# Patient Record
Sex: FEMALE | Race: WHITE | HISPANIC OR LATINO
[De-identification: names, ages, dates, MRNs, and addresses within clinical notes are randomized per-mention and may not be internally consistent; named-entity substitution may affect disease eponyms.]

---

## 2022-07-14 PROBLEM — Z00.00 ENCOUNTER FOR PREVENTIVE HEALTH EXAMINATION: Status: ACTIVE | Noted: 2022-07-14

## 2022-07-18 ENCOUNTER — LABORATORY RESULT (OUTPATIENT)
Age: 37
End: 2022-07-18

## 2022-07-18 ENCOUNTER — NON-APPOINTMENT (OUTPATIENT)
Age: 37
End: 2022-07-18

## 2022-07-18 ENCOUNTER — APPOINTMENT (OUTPATIENT)
Dept: OBGYN | Facility: CLINIC | Age: 37
End: 2022-07-18

## 2022-07-18 VITALS
HEIGHT: 64 IN | DIASTOLIC BLOOD PRESSURE: 70 MMHG | BODY MASS INDEX: 19.29 KG/M2 | SYSTOLIC BLOOD PRESSURE: 108 MMHG | WEIGHT: 113 LBS

## 2022-07-18 DIAGNOSIS — Z78.9 OTHER SPECIFIED HEALTH STATUS: ICD-10-CM

## 2022-07-18 DIAGNOSIS — F12.90 CANNABIS USE, UNSPECIFIED, UNCOMPLICATED: ICD-10-CM

## 2022-07-18 DIAGNOSIS — Z01.419 ENCOUNTER FOR GYNECOLOGICAL EXAMINATION (GENERAL) (ROUTINE) W/OUT ABNORMAL FINDINGS: ICD-10-CM

## 2022-07-18 DIAGNOSIS — Z87.09 PERSONAL HISTORY OF OTHER DISEASES OF THE RESPIRATORY SYSTEM: ICD-10-CM

## 2022-07-18 PROCEDURE — 99203 OFFICE O/P NEW LOW 30 MIN: CPT | Mod: 25

## 2022-07-18 PROCEDURE — 99385 PREV VISIT NEW AGE 18-39: CPT

## 2022-07-18 PROCEDURE — 36415 COLL VENOUS BLD VENIPUNCTURE: CPT

## 2022-07-18 PROCEDURE — 81025 URINE PREGNANCY TEST: CPT

## 2022-07-18 RX ORDER — L-DESOXYEPHEDRINE 50 MG
50 INHALER (EA) NASAL
Refills: 0 | Status: ACTIVE | COMMUNITY

## 2022-07-18 RX ORDER — TIOTROPIUM BROMIDE INHALATION SPRAY 1.56 UG/1
1.25 SPRAY, METERED RESPIRATORY (INHALATION)
Qty: 4 | Refills: 0 | Status: ACTIVE | COMMUNITY
Start: 2022-06-15

## 2022-07-18 RX ORDER — ALBUTEROL SULFATE 90 UG/1
108 (90 BASE) INHALANT RESPIRATORY (INHALATION)
Qty: 6 | Refills: 0 | Status: ACTIVE | COMMUNITY
Start: 2022-06-15

## 2022-07-18 RX ORDER — LEVOTHYROXINE SODIUM 0.05 MG/1
50 TABLET ORAL
Qty: 90 | Refills: 0 | Status: ACTIVE | COMMUNITY
Start: 2022-06-15

## 2022-07-18 NOTE — DISCUSSION/SUMMARY
[FreeTextEntry1] : Cherelle Corea is a 37 year G0 here for WWE, infertility, fibroids, right tubal occlusion, normal exam\par - for tvus and amh, fertility w/u - if amh low - i would have her see Leo instead of trying to fix tube\par - f/u after tvus to disucss labs and sono\par - pap/hpv, GC/CH sent\par - SBE reviewed\par - start pnv\par \par

## 2022-07-18 NOTE — PHYSICAL EXAM
[Appropriately responsive] : appropriately responsive [Alert] : alert [No Acute Distress] : no acute distress [No Lymphadenopathy] : no lymphadenopathy [Regular Rate Rhythm] : regular rate rhythm [Soft] : soft [Non-tender] : non-tender [Non-distended] : non-distended [No HSM] : No HSM [No Lesions] : no lesions [No Mass] : no mass [Oriented x3] : oriented x3 [FreeTextEntry6] : right nipple ring [Examination Of The Breasts] : a normal appearance [No Masses] : no breast masses were palpable [Labia Majora] : normal [Labia Minora] : normal [Normal] : normal [Uterine Adnexae] : normal

## 2022-07-18 NOTE — HISTORY OF PRESENT ILLNESS
[FreeTextEntry1] : Cherelle Corea is a 37 year F G0 here for annual but also to discuss fibroids and right tubal occlusion. Patient moved to  less than one year ago. in [place], she was told she had a fibroid and her right tube was occluded on HSG. She was worked up for dysmenorrhea. Patient has been with her partner for 4 years, they have not been using any form of BC for 1-1.5 years and never conceived. She is concerned. She reports occasional dyspareunia, regular cycles, painful menses. Her previous obgyn mentioned that her tube needs to be fixed.\par Denies vaginal bleeding, discharge.\par \par BC: none\par Gynhx: Menarche at 13 yo. Reg menses, No h/o STIs/cysts/abn paps; h/o fibroids and right tubal occlusion\par Obhx:nullop\par \par Last mammo:not yet\par Last Colonoscopy:n/a\par Last Pap smear:1-2 years ago\par Last dexa:not yet\par

## 2022-07-25 ENCOUNTER — ASOB RESULT (OUTPATIENT)
Age: 37
End: 2022-07-25

## 2022-07-25 ENCOUNTER — APPOINTMENT (OUTPATIENT)
Dept: ANTEPARTUM | Facility: CLINIC | Age: 37
End: 2022-07-25

## 2022-07-25 ENCOUNTER — APPOINTMENT (OUTPATIENT)
Dept: OBGYN | Facility: CLINIC | Age: 37
End: 2022-07-25

## 2022-07-25 DIAGNOSIS — D21.9 BENIGN NEOPLASM OF CONNECTIVE AND OTHER SOFT TISSUE, UNSPECIFIED: ICD-10-CM

## 2022-07-25 DIAGNOSIS — N97.1 FEMALE INFERTILITY OF TUBAL ORIGIN: ICD-10-CM

## 2022-07-25 PROCEDURE — 76830 TRANSVAGINAL US NON-OB: CPT

## 2022-07-25 PROCEDURE — 76856 US EXAM PELVIC COMPLETE: CPT | Mod: 59

## 2022-07-25 PROCEDURE — 99441: CPT

## 2022-07-25 NOTE — HISTORY OF PRESENT ILLNESS
[FreeTextEntry1] : this was a telephonic visit. juliana consented to it. She was at home and I was at my [place] office. total time spent on phone was 5.5 mins\par \par Cherelle Corea is a 37 year F G0 here for folllow up to discuss fibroids and right tubal occlusion. She had tvus today and blood work done. TVUS normal - small 1.1 cm fibroid, adnexa nl\par lab work nl, amh 2.3, prolactin 29.4 but she was sexually active the night before.\par Will get patient's records to see what tube was occluded, she will also see with her insurance - what is covered in terms of fertility - it might make more sense to pursue IVF if completely covered\par \par Patient moved to  less than one year ago. in [place], she was told she had a fibroid and her right tube was occluded on HSG. She was worked up for dysmenorrhea. Patient has been with her partner for 4 years, they have not been using any form of BC for 1-1.5 years and never conceived. She is concerned. She reports occasional dyspareunia, regular cycles, painful menses. Her previous obgyn mentioned that her tube needs to be fixed. Denies vaginal bleeding, discharge.\par \par BC: none\par Gynhx: Menarche at 13 yo. Reg menses, No h/o STIs/cysts/abn paps; h/o fibroids and right tubal occlusion\par Obhx:nullip\par \par Last mammo:not yet\par Last Colonoscopy:n/a\par Last Pap smear:1-2 years ago\par Last dexa:not yet\par

## 2022-08-30 ENCOUNTER — NON-APPOINTMENT (OUTPATIENT)
Age: 37
End: 2022-08-30

## 2022-09-08 ENCOUNTER — APPOINTMENT (OUTPATIENT)
Dept: OBGYN | Facility: CLINIC | Age: 37
End: 2022-09-08

## 2022-09-08 VITALS
WEIGHT: 117 LBS | BODY MASS INDEX: 19.97 KG/M2 | SYSTOLIC BLOOD PRESSURE: 120 MMHG | DIASTOLIC BLOOD PRESSURE: 78 MMHG | HEIGHT: 64 IN

## 2022-09-08 DIAGNOSIS — N97.9 FEMALE INFERTILITY, UNSPECIFIED: ICD-10-CM

## 2022-09-08 PROCEDURE — 99213 OFFICE O/P EST LOW 20 MIN: CPT

## 2022-09-08 NOTE — HISTORY OF PRESENT ILLNESS
[FreeTextEntry1] : Patient is here to follow up infertility. We received her records from her previous obgyn.\par Lab work all normal from 4/2021 except TSH high - which was normal once synthroid started \par HSG shows right tubal occlusion. normal left fallopian tube\par \par AMH at our office 2.31 on 8/1/2022\par prolactin 29.4 (under <30)\par TSh nl\par gc/ch neg\par pap nilm, HR HPV pos - will repeat in one year\par tvus on 7/25/2022 - normal pelvic sono\par \par \par I explained to patient that it would not make sense to do surgery for right tube before getting sperm analysis as that might be an issue as well necessitating IVF possibly/\par i recommended her to see DEANNA - info given\par f/u in 1 year for pap/hpv

## 2022-09-09 ENCOUNTER — NON-APPOINTMENT (OUTPATIENT)
Age: 37
End: 2022-09-09

## 2022-09-30 ENCOUNTER — APPOINTMENT (OUTPATIENT)
Dept: HUMAN REPRODUCTION | Facility: CLINIC | Age: 37
End: 2022-09-30

## 2022-09-30 PROCEDURE — 36415 COLL VENOUS BLD VENIPUNCTURE: CPT

## 2022-09-30 PROCEDURE — 99204 OFFICE O/P NEW MOD 45 MIN: CPT | Mod: 25

## 2022-10-12 ENCOUNTER — APPOINTMENT (OUTPATIENT)
Dept: HUMAN REPRODUCTION | Facility: CLINIC | Age: 37
End: 2022-10-12

## 2022-10-12 PROCEDURE — 76830 TRANSVAGINAL US NON-OB: CPT

## 2022-10-12 PROCEDURE — 99213 OFFICE O/P EST LOW 20 MIN: CPT | Mod: 25

## 2022-10-12 PROCEDURE — 36415 COLL VENOUS BLD VENIPUNCTURE: CPT

## 2022-10-19 ENCOUNTER — APPOINTMENT (OUTPATIENT)
Dept: HUMAN REPRODUCTION | Facility: CLINIC | Age: 37
End: 2022-10-19

## 2022-10-19 PROCEDURE — 76830 TRANSVAGINAL US NON-OB: CPT

## 2022-10-19 PROCEDURE — 99213 OFFICE O/P EST LOW 20 MIN: CPT | Mod: 25

## 2022-10-27 ENCOUNTER — APPOINTMENT (OUTPATIENT)
Dept: HUMAN REPRODUCTION | Facility: CLINIC | Age: 37
End: 2022-10-27

## 2022-10-27 PROCEDURE — 99214 OFFICE O/P EST MOD 30 MIN: CPT | Mod: 95

## 2022-11-15 ENCOUNTER — APPOINTMENT (OUTPATIENT)
Dept: HUMAN REPRODUCTION | Facility: CLINIC | Age: 37
End: 2022-11-15

## 2022-11-15 PROCEDURE — S4042: CPT | Mod: 52

## 2022-11-15 PROCEDURE — 36415 COLL VENOUS BLD VENIPUNCTURE: CPT

## 2022-11-15 PROCEDURE — 76830 TRANSVAGINAL US NON-OB: CPT

## 2022-11-23 ENCOUNTER — APPOINTMENT (OUTPATIENT)
Dept: HUMAN REPRODUCTION | Facility: CLINIC | Age: 37
End: 2022-11-23

## 2022-11-23 PROCEDURE — 76831 ECHO EXAM UTERUS: CPT

## 2022-11-23 PROCEDURE — 58340 CATHETER FOR HYSTEROGRAPHY: CPT

## 2022-11-30 ENCOUNTER — APPOINTMENT (OUTPATIENT)
Dept: HUMAN REPRODUCTION | Facility: CLINIC | Age: 37
End: 2022-11-30

## 2022-11-30 PROCEDURE — 76856 US EXAM PELVIC COMPLETE: CPT

## 2022-11-30 PROCEDURE — 58999I: CUSTOM

## 2022-12-16 ENCOUNTER — APPOINTMENT (OUTPATIENT)
Dept: HUMAN REPRODUCTION | Facility: CLINIC | Age: 37
End: 2022-12-16

## 2022-12-16 PROCEDURE — 76857 US EXAM PELVIC LIMITED: CPT

## 2022-12-16 PROCEDURE — 99213 OFFICE O/P EST LOW 20 MIN: CPT | Mod: 25

## 2022-12-16 PROCEDURE — 36415 COLL VENOUS BLD VENIPUNCTURE: CPT

## 2023-01-04 ENCOUNTER — APPOINTMENT (OUTPATIENT)
Dept: HUMAN REPRODUCTION | Facility: CLINIC | Age: 38
End: 2023-01-04
Payer: COMMERCIAL

## 2023-01-04 PROCEDURE — 76857 US EXAM PELVIC LIMITED: CPT

## 2023-01-04 PROCEDURE — 36415 COLL VENOUS BLD VENIPUNCTURE: CPT

## 2023-01-04 PROCEDURE — 99213 OFFICE O/P EST LOW 20 MIN: CPT | Mod: 25

## 2023-01-09 ENCOUNTER — APPOINTMENT (OUTPATIENT)
Dept: HUMAN REPRODUCTION | Facility: CLINIC | Age: 38
End: 2023-01-09
Payer: COMMERCIAL

## 2023-01-09 PROCEDURE — 99211 OFF/OP EST MAY X REQ PHY/QHP: CPT | Mod: 25

## 2023-01-09 PROCEDURE — 36415 COLL VENOUS BLD VENIPUNCTURE: CPT

## 2023-01-11 ENCOUNTER — APPOINTMENT (OUTPATIENT)
Dept: HUMAN REPRODUCTION | Facility: CLINIC | Age: 38
End: 2023-01-11
Payer: COMMERCIAL

## 2023-01-11 PROCEDURE — 36415 COLL VENOUS BLD VENIPUNCTURE: CPT

## 2023-01-11 PROCEDURE — 99213 OFFICE O/P EST LOW 20 MIN: CPT | Mod: 25

## 2023-01-11 PROCEDURE — 76857 US EXAM PELVIC LIMITED: CPT

## 2023-01-13 ENCOUNTER — APPOINTMENT (OUTPATIENT)
Dept: HUMAN REPRODUCTION | Facility: CLINIC | Age: 38
End: 2023-01-13
Payer: COMMERCIAL

## 2023-01-13 PROCEDURE — 99213 OFFICE O/P EST LOW 20 MIN: CPT | Mod: 25

## 2023-01-13 PROCEDURE — 96372 THER/PROPH/DIAG INJ SC/IM: CPT

## 2023-01-13 PROCEDURE — 36415 COLL VENOUS BLD VENIPUNCTURE: CPT

## 2023-01-13 PROCEDURE — 76857 US EXAM PELVIC LIMITED: CPT

## 2023-01-14 ENCOUNTER — APPOINTMENT (OUTPATIENT)
Dept: HUMAN REPRODUCTION | Facility: CLINIC | Age: 38
End: 2023-01-14
Payer: COMMERCIAL

## 2023-01-14 PROCEDURE — 99213 OFFICE O/P EST LOW 20 MIN: CPT | Mod: 25

## 2023-01-14 PROCEDURE — 76857 US EXAM PELVIC LIMITED: CPT

## 2023-01-14 PROCEDURE — 36415 COLL VENOUS BLD VENIPUNCTURE: CPT

## 2023-01-16 ENCOUNTER — APPOINTMENT (OUTPATIENT)
Dept: HUMAN REPRODUCTION | Facility: CLINIC | Age: 38
End: 2023-01-16
Payer: COMMERCIAL

## 2023-01-16 PROCEDURE — 99213 OFFICE O/P EST LOW 20 MIN: CPT | Mod: 25

## 2023-01-16 PROCEDURE — 36415 COLL VENOUS BLD VENIPUNCTURE: CPT

## 2023-01-16 PROCEDURE — 76857 US EXAM PELVIC LIMITED: CPT

## 2023-01-17 ENCOUNTER — APPOINTMENT (OUTPATIENT)
Dept: HUMAN REPRODUCTION | Facility: CLINIC | Age: 38
End: 2023-01-17
Payer: COMMERCIAL

## 2023-01-17 PROCEDURE — 99213 OFFICE O/P EST LOW 20 MIN: CPT | Mod: 25

## 2023-01-17 PROCEDURE — 76857 US EXAM PELVIC LIMITED: CPT

## 2023-01-17 PROCEDURE — 36415 COLL VENOUS BLD VENIPUNCTURE: CPT

## 2023-01-18 ENCOUNTER — APPOINTMENT (OUTPATIENT)
Dept: HUMAN REPRODUCTION | Facility: CLINIC | Age: 38
End: 2023-01-18
Payer: COMMERCIAL

## 2023-01-18 PROCEDURE — 36415 COLL VENOUS BLD VENIPUNCTURE: CPT

## 2023-01-18 PROCEDURE — 76857 US EXAM PELVIC LIMITED: CPT

## 2023-01-18 PROCEDURE — 99213 OFFICE O/P EST LOW 20 MIN: CPT | Mod: 25

## 2023-01-19 ENCOUNTER — APPOINTMENT (OUTPATIENT)
Dept: HUMAN REPRODUCTION | Facility: CLINIC | Age: 38
End: 2023-01-19
Payer: COMMERCIAL

## 2023-01-19 PROCEDURE — 36415 COLL VENOUS BLD VENIPUNCTURE: CPT

## 2023-01-20 ENCOUNTER — APPOINTMENT (OUTPATIENT)
Dept: HUMAN REPRODUCTION | Facility: CLINIC | Age: 38
End: 2023-01-20
Payer: COMMERCIAL

## 2023-01-20 PROCEDURE — 58970 RETRIEVAL OF OOCYTE: CPT

## 2023-01-20 PROCEDURE — 89268 INSEMINATION OF OOCYTES: CPT

## 2023-01-20 PROCEDURE — 89261 SPERM ISOLATION COMPLEX: CPT

## 2023-01-20 PROCEDURE — 76948 ECHO GUIDE OVA ASPIRATION: CPT

## 2023-01-20 PROCEDURE — 89254 OOCYTE IDENTIFICATION: CPT

## 2023-01-20 PROCEDURE — 89250 CULTR OOCYTE/EMBRYO <4 DAYS: CPT

## 2023-01-21 ENCOUNTER — APPOINTMENT (OUTPATIENT)
Dept: HUMAN REPRODUCTION | Facility: CLINIC | Age: 38
End: 2023-01-21
Payer: COMMERCIAL

## 2023-01-25 PROCEDURE — 89272 EXTENDED CULTURE OF OOCYTES: CPT

## 2023-01-26 PROCEDURE — 89258 CRYOPRESERVATION EMBRYO(S): CPT

## 2023-01-26 PROCEDURE — 89342 STORAGE/YEAR EMBRYO(S): CPT

## 2023-01-27 ENCOUNTER — APPOINTMENT (OUTPATIENT)
Dept: HUMAN REPRODUCTION | Facility: CLINIC | Age: 38
End: 2023-01-27
Payer: COMMERCIAL

## 2023-01-27 PROCEDURE — 99213 OFFICE O/P EST LOW 20 MIN: CPT | Mod: 25

## 2023-01-27 PROCEDURE — 76857 US EXAM PELVIC LIMITED: CPT

## 2023-01-31 ENCOUNTER — APPOINTMENT (OUTPATIENT)
Dept: HUMAN REPRODUCTION | Facility: CLINIC | Age: 38
End: 2023-01-31
Payer: COMMERCIAL

## 2023-01-31 PROCEDURE — 99213 OFFICE O/P EST LOW 20 MIN: CPT | Mod: 25

## 2023-01-31 PROCEDURE — 36415 COLL VENOUS BLD VENIPUNCTURE: CPT

## 2023-01-31 PROCEDURE — 76857 US EXAM PELVIC LIMITED: CPT

## 2023-02-14 ENCOUNTER — APPOINTMENT (OUTPATIENT)
Dept: HUMAN REPRODUCTION | Facility: CLINIC | Age: 38
End: 2023-02-14
Payer: COMMERCIAL

## 2023-02-14 PROCEDURE — 76857 US EXAM PELVIC LIMITED: CPT

## 2023-02-14 PROCEDURE — 99213 OFFICE O/P EST LOW 20 MIN: CPT | Mod: 25

## 2023-02-14 PROCEDURE — 36415 COLL VENOUS BLD VENIPUNCTURE: CPT

## 2023-02-21 ENCOUNTER — APPOINTMENT (OUTPATIENT)
Dept: HUMAN REPRODUCTION | Facility: CLINIC | Age: 38
End: 2023-02-21
Payer: COMMERCIAL

## 2023-02-21 PROCEDURE — 76857 US EXAM PELVIC LIMITED: CPT

## 2023-02-21 PROCEDURE — 99213 OFFICE O/P EST LOW 20 MIN: CPT | Mod: 25

## 2023-02-21 PROCEDURE — 36415 COLL VENOUS BLD VENIPUNCTURE: CPT

## 2023-02-27 ENCOUNTER — APPOINTMENT (OUTPATIENT)
Dept: HUMAN REPRODUCTION | Facility: CLINIC | Age: 38
End: 2023-02-27
Payer: COMMERCIAL

## 2023-02-27 PROCEDURE — 89255 PREPARE EMBRYO FOR TRANSFER: CPT

## 2023-02-27 PROCEDURE — 89352 THAWING CRYOPRESRVED EMBRYO: CPT

## 2023-02-27 PROCEDURE — 76998 US GUIDE INTRAOP: CPT

## 2023-02-27 PROCEDURE — 58974 EMBRYO TRANSFER INTRAUTERINE: CPT

## 2023-02-27 PROCEDURE — 89253 EMBRYO HATCHING: CPT

## 2023-02-27 PROCEDURE — 89398A: CUSTOM

## 2023-02-28 ENCOUNTER — APPOINTMENT (OUTPATIENT)
Dept: HUMAN REPRODUCTION | Facility: CLINIC | Age: 38
End: 2023-02-28
Payer: COMMERCIAL

## 2023-03-08 ENCOUNTER — APPOINTMENT (OUTPATIENT)
Dept: HUMAN REPRODUCTION | Facility: CLINIC | Age: 38
End: 2023-03-08
Payer: COMMERCIAL

## 2023-03-08 PROCEDURE — 36415 COLL VENOUS BLD VENIPUNCTURE: CPT

## 2023-03-10 ENCOUNTER — APPOINTMENT (OUTPATIENT)
Dept: HUMAN REPRODUCTION | Facility: CLINIC | Age: 38
End: 2023-03-10
Payer: COMMERCIAL

## 2023-03-10 PROCEDURE — 36415 COLL VENOUS BLD VENIPUNCTURE: CPT

## 2023-03-10 PROCEDURE — 99213 OFFICE O/P EST LOW 20 MIN: CPT | Mod: 25

## 2023-03-20 ENCOUNTER — APPOINTMENT (OUTPATIENT)
Dept: HUMAN REPRODUCTION | Facility: CLINIC | Age: 38
End: 2023-03-20
Payer: COMMERCIAL

## 2023-03-20 PROCEDURE — 99213 OFFICE O/P EST LOW 20 MIN: CPT | Mod: 25

## 2023-03-20 PROCEDURE — 76817 TRANSVAGINAL US OBSTETRIC: CPT

## 2023-03-24 ENCOUNTER — APPOINTMENT (OUTPATIENT)
Dept: HUMAN REPRODUCTION | Facility: CLINIC | Age: 38
End: 2023-03-24
Payer: COMMERCIAL

## 2023-03-24 PROCEDURE — 76817 TRANSVAGINAL US OBSTETRIC: CPT

## 2023-03-24 PROCEDURE — 99213 OFFICE O/P EST LOW 20 MIN: CPT | Mod: 25

## 2023-03-24 PROCEDURE — 36415 COLL VENOUS BLD VENIPUNCTURE: CPT

## 2023-03-27 ENCOUNTER — APPOINTMENT (OUTPATIENT)
Dept: HUMAN REPRODUCTION | Facility: CLINIC | Age: 38
End: 2023-03-27
Payer: COMMERCIAL

## 2023-03-27 PROCEDURE — 76817 TRANSVAGINAL US OBSTETRIC: CPT

## 2023-03-27 PROCEDURE — 36415 COLL VENOUS BLD VENIPUNCTURE: CPT

## 2023-03-27 PROCEDURE — 99213 OFFICE O/P EST LOW 20 MIN: CPT | Mod: 25

## 2023-03-30 ENCOUNTER — APPOINTMENT (OUTPATIENT)
Dept: OBGYN | Facility: CLINIC | Age: 38
End: 2023-03-30
Payer: COMMERCIAL

## 2023-03-30 VITALS
SYSTOLIC BLOOD PRESSURE: 104 MMHG | HEIGHT: 64 IN | WEIGHT: 119 LBS | BODY MASS INDEX: 20.32 KG/M2 | DIASTOLIC BLOOD PRESSURE: 70 MMHG

## 2023-03-30 PROCEDURE — P9615: CPT

## 2023-03-30 PROCEDURE — 99214 OFFICE O/P EST MOD 30 MIN: CPT

## 2023-03-30 PROCEDURE — 36415 COLL VENOUS BLD VENIPUNCTURE: CPT

## 2023-03-30 NOTE — DISCUSSION/SUMMARY
[FreeTextEntry1] : 37 year yr G P @  wks here for confirmation of pregnancy, ama, IVF\par - pap/hpv uTD - due 7/2023 (ws HPV pos, pap nilm)\par - gc/ch sent\par - continue pnv\par - AMA - start asa at 12 weeks, for gc referral (also reports FOB's sisters child had hydrocephaly and also another rmemeber in his family did as well) - referral faxed today\par - for viability sono and start PN care in 2 weeks\par - [facility] done at [facility] - will get records\par - discussed aneuploidy testing - will do it\par - sbe reviewed

## 2023-03-30 NOTE — HISTORY OF PRESENT ILLNESS
[FreeTextEntry1] : Cherelle Corea is a 37 year F G0 here for nfirmation of pregnancy. IVF By Dr. Ceron. Frozen embryo (not PGD done) on 2/27/2023, EDC 11/15/2023.\par \par  Denies vaginal bleeding, discharge.\par \par BC: none\par Gynhx: Menarche at 11 yo. Reg menses, No h/o STIs/cysts/abn paps; h/o fibroids and right tubal occlusion\par Obhx:nullip\par \par Last mammo:not yet\par Last Colonoscopy:n/a\par Last Pap smear:7/2022 - HR HPV pos, Pap nilm - needs repeat 7/2023\par Last dexa:not yet\par

## 2023-03-31 LAB
ABO + RH PNL BLD: NORMAL
BASOPHILS # BLD AUTO: 0.06 K/UL
BASOPHILS NFR BLD AUTO: 0.7 %
BLD GP AB SCN SERPL QL: NORMAL
C TRACH RRNA SPEC QL NAA+PROBE: NOT DETECTED
EOSINOPHIL # BLD AUTO: 0.11 K/UL
EOSINOPHIL NFR BLD AUTO: 1.2 %
HBV SURFACE AG SER QL: NONREACTIVE
HCT VFR BLD CALC: 41 %
HCV AB SER QL: NONREACTIVE
HCV S/CO RATIO: 0.18 S/CO
HGB A MFR BLD: 97.3 %
HGB A2 MFR BLD: 2.3 %
HGB BLD-MCNC: 13.2 G/DL
HGB F MFR BLD: 0.4 %
HGB FRACT BLD-IMP: NORMAL
HIV1+2 AB SPEC QL IA.RAPID: NONREACTIVE
IMM GRANULOCYTES NFR BLD AUTO: 0.2 %
LYMPHOCYTES # BLD AUTO: 2.95 K/UL
LYMPHOCYTES NFR BLD AUTO: 32.6 %
MAN DIFF?: NORMAL
MCHC RBC-ENTMCNC: 28.6 PG
MCHC RBC-ENTMCNC: 32.2 GM/DL
MCV RBC AUTO: 88.9 FL
MONOCYTES # BLD AUTO: 0.43 K/UL
MONOCYTES NFR BLD AUTO: 4.7 %
N GONORRHOEA RRNA SPEC QL NAA+PROBE: NOT DETECTED
NEUTROPHILS # BLD AUTO: 5.49 K/UL
NEUTROPHILS NFR BLD AUTO: 60.6 %
PLATELET # BLD AUTO: 300 K/UL
RBC # BLD: 4.61 M/UL
RBC # FLD: 12.5 %
SOURCE AMPLIFICATION: NORMAL
T PALLIDUM AB SER QL IA: NEGATIVE
WBC # FLD AUTO: 9.06 K/UL

## 2023-04-03 LAB
BACTERIA UR CULT: NORMAL
CMV IGG SERPL QL: 7.3 U/ML
CMV IGG SERPL-IMP: POSITIVE
CMV IGM SERPL QL: <8 AU/ML
CMV IGM SERPL QL: NEGATIVE
MEV IGG FLD QL IA: >300 AU/ML
MEV IGG+IGM SER-IMP: POSITIVE
RUBV IGG FLD-ACNC: 11.4 INDEX
RUBV IGG SER-IMP: POSITIVE
T GONDII AB SER-IMP: NEGATIVE
T GONDII AB SER-IMP: NEGATIVE
T GONDII IGG SER QL: <3 IU/ML
T GONDII IGM SER QL: <3 AU/ML
VZV AB TITR SER: POSITIVE
VZV IGG SER IF-ACNC: 2355 INDEX

## 2023-04-07 ENCOUNTER — APPOINTMENT (OUTPATIENT)
Dept: ANTEPARTUM | Facility: CLINIC | Age: 38
End: 2023-04-07

## 2023-04-10 ENCOUNTER — NON-APPOINTMENT (OUTPATIENT)
Age: 38
End: 2023-04-10

## 2023-04-11 LAB
B19V IGG SER QL IA: 0.19 INDEX
B19V IGG+IGM SER-IMP: NEGATIVE
B19V IGG+IGM SER-IMP: NORMAL
B19V IGM FLD-ACNC: 0.11 INDEX
B19V IGM SER-ACNC: NEGATIVE
M TB IFN-G BLD-IMP: NEGATIVE
QUANTIFERON TB PLUS MITOGEN MINUS NIL: >10 IU/ML
QUANTIFERON TB PLUS NIL: 0.01 IU/ML
QUANTIFERON TB PLUS TB1 MINUS NIL: 0 IU/ML
QUANTIFERON TB PLUS TB2 MINUS NIL: 0 IU/ML

## 2023-04-14 ENCOUNTER — NON-APPOINTMENT (OUTPATIENT)
Age: 38
End: 2023-04-14

## 2023-04-14 ENCOUNTER — ASOB RESULT (OUTPATIENT)
Age: 38
End: 2023-04-14

## 2023-04-14 ENCOUNTER — APPOINTMENT (OUTPATIENT)
Dept: ANTEPARTUM | Facility: CLINIC | Age: 38
End: 2023-04-14
Payer: COMMERCIAL

## 2023-04-14 PROCEDURE — 76815 OB US LIMITED FETUS(S): CPT

## 2023-04-21 ENCOUNTER — ASOB RESULT (OUTPATIENT)
Age: 38
End: 2023-04-21

## 2023-04-21 ENCOUNTER — APPOINTMENT (OUTPATIENT)
Dept: MATERNAL FETAL MEDICINE | Facility: CLINIC | Age: 38
End: 2023-04-21
Payer: COMMERCIAL

## 2023-04-21 PROCEDURE — 99202 OFFICE O/P NEW SF 15 MIN: CPT | Mod: 95

## 2023-04-24 ENCOUNTER — NON-APPOINTMENT (OUTPATIENT)
Age: 38
End: 2023-04-24

## 2023-04-25 ENCOUNTER — NON-APPOINTMENT (OUTPATIENT)
Age: 38
End: 2023-04-25

## 2023-04-25 ENCOUNTER — APPOINTMENT (OUTPATIENT)
Dept: OBGYN | Facility: CLINIC | Age: 38
End: 2023-04-25
Payer: COMMERCIAL

## 2023-04-25 VITALS
HEIGHT: 64 IN | DIASTOLIC BLOOD PRESSURE: 62 MMHG | WEIGHT: 130 LBS | SYSTOLIC BLOOD PRESSURE: 110 MMHG | BODY MASS INDEX: 22.2 KG/M2

## 2023-04-25 PROCEDURE — 0500F INITIAL PRENATAL CARE VISIT: CPT

## 2023-04-25 PROCEDURE — 36415 COLL VENOUS BLD VENIPUNCTURE: CPT

## 2023-05-03 ENCOUNTER — NON-APPOINTMENT (OUTPATIENT)
Age: 38
End: 2023-05-03

## 2023-05-03 LAB
CHROMOSOME13 INTERPRETATION: NORMAL
CHROMOSOME13 TEST RESULT: NORMAL
CHROMOSOME18 INTERPRETATION: NORMAL
CHROMOSOME18 TEST RESULT: NORMAL
CHROMOSOME21 INTERPRETATION: NORMAL
CHROMOSOME21 TEST RESULT: NORMAL
FETAL FRACTION: NORMAL
MICRODELETIONS INTERPRETATION: NORMAL
MICRODELETIONS RESULT: NORMAL
PERFORMANCE AND LIMITATIONS: NORMAL
RESULTS SUMMARY: NORMAL
SEX CHROMOSOME INTERPRETATION: NORMAL
SEX CHROMOSOME TEST RESULT: NORMAL
VERIFI WITH MICRODELETIONS: DETECTED

## 2023-05-04 ENCOUNTER — NON-APPOINTMENT (OUTPATIENT)
Age: 38
End: 2023-05-04

## 2023-05-09 ENCOUNTER — NON-APPOINTMENT (OUTPATIENT)
Age: 38
End: 2023-05-09

## 2023-05-09 ENCOUNTER — APPOINTMENT (OUTPATIENT)
Dept: MATERNAL FETAL MEDICINE | Facility: CLINIC | Age: 38
End: 2023-05-09
Payer: COMMERCIAL

## 2023-05-09 ENCOUNTER — ASOB RESULT (OUTPATIENT)
Age: 38
End: 2023-05-09

## 2023-05-09 ENCOUNTER — APPOINTMENT (OUTPATIENT)
Dept: ANTEPARTUM | Facility: CLINIC | Age: 38
End: 2023-05-09
Payer: COMMERCIAL

## 2023-05-09 ENCOUNTER — APPOINTMENT (OUTPATIENT)
Dept: OBGYN | Facility: CLINIC | Age: 38
End: 2023-05-09
Payer: COMMERCIAL

## 2023-05-09 VITALS
BODY MASS INDEX: 22.2 KG/M2 | WEIGHT: 130 LBS | DIASTOLIC BLOOD PRESSURE: 77 MMHG | SYSTOLIC BLOOD PRESSURE: 133 MMHG | HEIGHT: 64 IN

## 2023-05-09 DIAGNOSIS — O28.5 ABNORMAL CHROMOSOMAL AND GENETIC FINDING ON ANTENATAL SCREENING OF MOTHER: ICD-10-CM

## 2023-05-09 PROCEDURE — 99212 OFFICE O/P EST SF 10 MIN: CPT | Mod: 95

## 2023-05-09 PROCEDURE — 76801 OB US < 14 WKS SINGLE FETUS: CPT

## 2023-05-09 PROCEDURE — 0502F SUBSEQUENT PRENATAL CARE: CPT

## 2023-05-15 ENCOUNTER — APPOINTMENT (OUTPATIENT)
Dept: OBGYN | Facility: CLINIC | Age: 38
End: 2023-05-15
Payer: COMMERCIAL

## 2023-05-15 VITALS
HEIGHT: 64 IN | BODY MASS INDEX: 22.53 KG/M2 | SYSTOLIC BLOOD PRESSURE: 114 MMHG | WEIGHT: 132 LBS | DIASTOLIC BLOOD PRESSURE: 79 MMHG

## 2023-05-15 DIAGNOSIS — R33.9 RETENTION OF URINE, UNSPECIFIED: ICD-10-CM

## 2023-05-15 LAB
BILIRUB UR QL STRIP: NORMAL
CLARITY UR: CLEAR
COLLECTION METHOD: NORMAL
GLUCOSE UR-MCNC: NORMAL
HCG UR QL: 0.2 EU/DL
HGB UR QL STRIP.AUTO: NORMAL
KETONES UR-MCNC: NORMAL
LEUKOCYTE ESTERASE UR QL STRIP: NORMAL
NITRITE UR QL STRIP: NORMAL
PH UR STRIP: 6
PROT UR STRIP-MCNC: NORMAL
SP GR UR STRIP: 1.01

## 2023-05-15 PROCEDURE — 0502F SUBSEQUENT PRENATAL CARE: CPT

## 2023-05-16 ENCOUNTER — NON-APPOINTMENT (OUTPATIENT)
Age: 38
End: 2023-05-16

## 2023-05-16 ENCOUNTER — ASOB RESULT (OUTPATIENT)
Age: 38
End: 2023-05-16

## 2023-05-16 ENCOUNTER — APPOINTMENT (OUTPATIENT)
Dept: MATERNAL FETAL MEDICINE | Facility: CLINIC | Age: 38
End: 2023-05-16
Payer: COMMERCIAL

## 2023-05-16 ENCOUNTER — APPOINTMENT (OUTPATIENT)
Dept: ANTEPARTUM | Facility: CLINIC | Age: 38
End: 2023-05-16
Payer: COMMERCIAL

## 2023-05-16 DIAGNOSIS — Z36.0 ENCOUNTER FOR ANTENATAL SCREENING FOR CHROMOSOMAL ANOMALIES: ICD-10-CM

## 2023-05-16 PROCEDURE — ZZZZZ: CPT

## 2023-05-16 PROCEDURE — 59015 CHORION BIOPSY: CPT

## 2023-05-16 PROCEDURE — 76945 ECHO GUIDE VILLUS SAMPLING: CPT

## 2023-05-17 ENCOUNTER — NON-APPOINTMENT (OUTPATIENT)
Age: 38
End: 2023-05-17

## 2023-05-17 ENCOUNTER — TRANSCRIPTION ENCOUNTER (OUTPATIENT)
Age: 38
End: 2023-05-17

## 2023-05-18 ENCOUNTER — TRANSCRIPTION ENCOUNTER (OUTPATIENT)
Age: 38
End: 2023-05-18

## 2023-05-18 ENCOUNTER — INPATIENT (INPATIENT)
Facility: HOSPITAL | Age: 38
LOS: 1 days | Discharge: HOME | DRG: 779 | End: 2023-05-20
Attending: OBSTETRICS & GYNECOLOGY | Admitting: OBSTETRICS & GYNECOLOGY
Payer: COMMERCIAL

## 2023-05-18 ENCOUNTER — RESULT REVIEW (OUTPATIENT)
Age: 38
End: 2023-05-18

## 2023-05-18 ENCOUNTER — APPOINTMENT (OUTPATIENT)
Dept: MATERNAL FETAL MEDICINE | Facility: CLINIC | Age: 38
End: 2023-05-18
Payer: COMMERCIAL

## 2023-05-18 ENCOUNTER — APPOINTMENT (OUTPATIENT)
Dept: ANTEPARTUM | Facility: CLINIC | Age: 38
End: 2023-05-18
Payer: COMMERCIAL

## 2023-05-18 ENCOUNTER — NON-APPOINTMENT (OUTPATIENT)
Age: 38
End: 2023-05-18

## 2023-05-18 ENCOUNTER — ASOB RESULT (OUTPATIENT)
Age: 38
End: 2023-05-18

## 2023-05-18 VITALS
TEMPERATURE: 102 F | HEART RATE: 136 BPM | WEIGHT: 125 LBS | RESPIRATION RATE: 18 BRPM | DIASTOLIC BLOOD PRESSURE: 71 MMHG | OXYGEN SATURATION: 98 % | SYSTOLIC BLOOD PRESSURE: 111 MMHG

## 2023-05-18 VITALS
DIASTOLIC BLOOD PRESSURE: 76 MMHG | TEMPERATURE: 100.2 F | SYSTOLIC BLOOD PRESSURE: 124 MMHG | HEART RATE: 118 BPM | OXYGEN SATURATION: 98 % | HEIGHT: 64 IN | RESPIRATION RATE: 18 BRPM

## 2023-05-18 DIAGNOSIS — Z01.812 ENCOUNTER FOR PREPROCEDURAL LABORATORY EXAMINATION: ICD-10-CM

## 2023-05-18 DIAGNOSIS — A41.9 SEPSIS, UNSPECIFIED ORGANISM: ICD-10-CM

## 2023-05-18 DIAGNOSIS — O41.1290 CHORIOAMNIONITIS, UNSPECIFIED TRIMESTER, NOT APPLICABLE OR UNSPECIFIED: ICD-10-CM

## 2023-05-18 LAB
ALBUMIN SERPL ELPH-MCNC: 4.5 G/DL — SIGNIFICANT CHANGE UP (ref 3.3–5.2)
ALP SERPL-CCNC: 40 U/L — SIGNIFICANT CHANGE UP (ref 40–120)
ALT FLD-CCNC: 11 U/L — SIGNIFICANT CHANGE UP
ANION GAP SERPL CALC-SCNC: 18 MMOL/L — HIGH (ref 5–17)
APPEARANCE UR: CLEAR — SIGNIFICANT CHANGE UP
APTT BLD: 26.1 SEC — LOW (ref 27.5–35.5)
AST SERPL-CCNC: 17 U/L — SIGNIFICANT CHANGE UP
BACTERIA # UR AUTO: ABNORMAL
BASOPHILS # BLD AUTO: 0.05 K/UL — SIGNIFICANT CHANGE UP (ref 0–0.2)
BASOPHILS NFR BLD AUTO: 0.2 % — SIGNIFICANT CHANGE UP (ref 0–2)
BILIRUB SERPL-MCNC: 0.4 MG/DL — SIGNIFICANT CHANGE UP (ref 0.4–2)
BILIRUB UR-MCNC: NEGATIVE — SIGNIFICANT CHANGE UP
BLD GP AB SCN SERPL QL: SIGNIFICANT CHANGE UP
BUN SERPL-MCNC: 8.7 MG/DL — SIGNIFICANT CHANGE UP (ref 8–20)
CALCIUM SERPL-MCNC: 9.7 MG/DL — SIGNIFICANT CHANGE UP (ref 8.4–10.5)
CHLORIDE SERPL-SCNC: 95 MMOL/L — LOW (ref 96–108)
CO2 SERPL-SCNC: 20 MMOL/L — LOW (ref 22–29)
COLOR SPEC: YELLOW — SIGNIFICANT CHANGE UP
CREAT SERPL-MCNC: 0.75 MG/DL — SIGNIFICANT CHANGE UP (ref 0.5–1.3)
DIFF PNL FLD: ABNORMAL
EGFR: 104 ML/MIN/1.73M2 — SIGNIFICANT CHANGE UP
EOSINOPHIL # BLD AUTO: 0.06 K/UL — SIGNIFICANT CHANGE UP (ref 0–0.5)
EOSINOPHIL NFR BLD AUTO: 0.3 % — SIGNIFICANT CHANGE UP (ref 0–6)
EPI CELLS # UR: SIGNIFICANT CHANGE UP
FIBRINOGEN PPP-MCNC: 751 MG/DL — HIGH (ref 200–450)
GLUCOSE SERPL-MCNC: 85 MG/DL — SIGNIFICANT CHANGE UP (ref 70–99)
GLUCOSE UR QL: NEGATIVE MG/DL — SIGNIFICANT CHANGE UP
HCT VFR BLD CALC: 39.8 % — SIGNIFICANT CHANGE UP (ref 34.5–45)
HGB BLD-MCNC: 13.4 G/DL — SIGNIFICANT CHANGE UP (ref 11.5–15.5)
IMM GRANULOCYTES NFR BLD AUTO: 0.4 % — SIGNIFICANT CHANGE UP (ref 0–0.9)
INR BLD: 0.97 RATIO — SIGNIFICANT CHANGE UP (ref 0.88–1.16)
KETONES UR-MCNC: ABNORMAL
LACTATE BLDV-MCNC: 1.1 MMOL/L — SIGNIFICANT CHANGE UP (ref 0.5–2)
LEUKOCYTE ESTERASE UR-ACNC: ABNORMAL
LYMPHOCYTES # BLD AUTO: 1.54 K/UL — SIGNIFICANT CHANGE UP (ref 1–3.3)
LYMPHOCYTES # BLD AUTO: 7.3 % — LOW (ref 13–44)
MCHC RBC-ENTMCNC: 28.3 PG — SIGNIFICANT CHANGE UP (ref 27–34)
MCHC RBC-ENTMCNC: 33.7 GM/DL — SIGNIFICANT CHANGE UP (ref 32–36)
MCV RBC AUTO: 84.1 FL — SIGNIFICANT CHANGE UP (ref 80–100)
MONOCYTES # BLD AUTO: 1.24 K/UL — HIGH (ref 0–0.9)
MONOCYTES NFR BLD AUTO: 5.8 % — SIGNIFICANT CHANGE UP (ref 2–14)
NEUTROPHILS # BLD AUTO: 18.22 K/UL — HIGH (ref 1.8–7.4)
NEUTROPHILS NFR BLD AUTO: 86 % — HIGH (ref 43–77)
NITRITE UR-MCNC: NEGATIVE — SIGNIFICANT CHANGE UP
PH UR: 6 — SIGNIFICANT CHANGE UP (ref 5–8)
PLATELET # BLD AUTO: 333 K/UL — SIGNIFICANT CHANGE UP (ref 150–400)
POTASSIUM SERPL-MCNC: 3.4 MMOL/L — LOW (ref 3.5–5.3)
POTASSIUM SERPL-SCNC: 3.4 MMOL/L — LOW (ref 3.5–5.3)
PROT SERPL-MCNC: 7.2 G/DL — SIGNIFICANT CHANGE UP (ref 6.6–8.7)
PROT UR-MCNC: NEGATIVE — SIGNIFICANT CHANGE UP
PROTHROM AB SERPL-ACNC: 11.2 SEC — SIGNIFICANT CHANGE UP (ref 10.5–13.4)
RAPID RVP RESULT: SIGNIFICANT CHANGE UP
RBC # BLD: 4.73 M/UL — SIGNIFICANT CHANGE UP (ref 3.8–5.2)
RBC # FLD: 12.6 % — SIGNIFICANT CHANGE UP (ref 10.3–14.5)
RBC CASTS # UR COMP ASSIST: ABNORMAL /HPF (ref 0–4)
SARS-COV-2 RNA SPEC QL NAA+PROBE: SIGNIFICANT CHANGE UP
SODIUM SERPL-SCNC: 133 MMOL/L — LOW (ref 135–145)
SP GR SPEC: 1.01 — SIGNIFICANT CHANGE UP (ref 1.01–1.02)
UROBILINOGEN FLD QL: NEGATIVE MG/DL — SIGNIFICANT CHANGE UP
WBC # BLD: 21.2 K/UL — HIGH (ref 3.8–10.5)
WBC # FLD AUTO: 21.2 K/UL — HIGH (ref 3.8–10.5)
WBC UR QL: SIGNIFICANT CHANGE UP /HPF (ref 0–5)

## 2023-05-18 PROCEDURE — 99214 OFFICE O/P EST MOD 30 MIN: CPT

## 2023-05-18 PROCEDURE — 76998 US GUIDE INTRAOP: CPT | Mod: 26,GC

## 2023-05-18 PROCEDURE — 99291 CRITICAL CARE FIRST HOUR: CPT

## 2023-05-18 PROCEDURE — 88305 TISSUE EXAM BY PATHOLOGIST: CPT | Mod: 26

## 2023-05-18 PROCEDURE — 93010 ELECTROCARDIOGRAM REPORT: CPT

## 2023-05-18 PROCEDURE — 71045 X-RAY EXAM CHEST 1 VIEW: CPT | Mod: 26

## 2023-05-18 PROCEDURE — 76815 OB US LIMITED FETUS(S): CPT

## 2023-05-18 PROCEDURE — 71045 X-RAY EXAM CHEST 1 VIEW: CPT | Mod: 26,77

## 2023-05-18 PROCEDURE — 59821 TREATMENT OF MISCARRIAGE: CPT | Mod: GC

## 2023-05-18 RX ORDER — MIDAZOLAM HYDROCHLORIDE 1 MG/ML
1 INJECTION, SOLUTION INTRAMUSCULAR; INTRAVENOUS ONCE
Refills: 0 | Status: DISCONTINUED | OUTPATIENT
Start: 2023-05-18 | End: 2023-05-18

## 2023-05-18 RX ORDER — ACETAMINOPHEN 500 MG
975 TABLET ORAL EVERY 6 HOURS
Refills: 0 | Status: COMPLETED | OUTPATIENT
Start: 2023-05-18 | End: 2023-05-19

## 2023-05-18 RX ORDER — VANCOMYCIN HCL 1 G
1000 VIAL (EA) INTRAVENOUS ONCE
Refills: 0 | Status: COMPLETED | OUTPATIENT
Start: 2023-05-18 | End: 2023-05-18

## 2023-05-18 RX ORDER — IPRATROPIUM/ALBUTEROL SULFATE 18-103MCG
3 AEROSOL WITH ADAPTER (GRAM) INHALATION ONCE
Refills: 0 | Status: COMPLETED | OUTPATIENT
Start: 2023-05-18 | End: 2023-05-18

## 2023-05-18 RX ORDER — PIPERACILLIN AND TAZOBACTAM 4; .5 G/20ML; G/20ML
4.5 INJECTION, POWDER, LYOPHILIZED, FOR SOLUTION INTRAVENOUS EVERY 8 HOURS
Refills: 0 | Status: COMPLETED | OUTPATIENT
Start: 2023-05-18 | End: 2023-05-20

## 2023-05-18 RX ORDER — ALBUTEROL 90 UG/1
2.5 AEROSOL, METERED ORAL ONCE
Refills: 0 | Status: COMPLETED | OUTPATIENT
Start: 2023-05-18 | End: 2023-05-18

## 2023-05-18 RX ORDER — SODIUM CHLORIDE 9 MG/ML
1000 INJECTION, SOLUTION INTRAVENOUS
Refills: 0 | Status: DISCONTINUED | OUTPATIENT
Start: 2023-05-18 | End: 2023-05-18

## 2023-05-18 RX ORDER — OXYCODONE HYDROCHLORIDE 5 MG/1
5 TABLET ORAL EVERY 4 HOURS
Refills: 0 | Status: DISCONTINUED | OUTPATIENT
Start: 2023-05-18 | End: 2023-05-20

## 2023-05-18 RX ORDER — MORPHINE SULFATE 50 MG/1
2 CAPSULE, EXTENDED RELEASE ORAL ONCE
Refills: 0 | Status: DISCONTINUED | OUTPATIENT
Start: 2023-05-18 | End: 2023-05-18

## 2023-05-18 RX ORDER — FENTANYL CITRATE 50 UG/ML
50 INJECTION INTRAVENOUS
Refills: 0 | Status: DISCONTINUED | OUTPATIENT
Start: 2023-05-18 | End: 2023-05-18

## 2023-05-18 RX ORDER — PIPERACILLIN AND TAZOBACTAM 4; .5 G/20ML; G/20ML
3.38 INJECTION, POWDER, LYOPHILIZED, FOR SOLUTION INTRAVENOUS ONCE
Refills: 0 | Status: COMPLETED | OUTPATIENT
Start: 2023-05-18 | End: 2023-05-18

## 2023-05-18 RX ORDER — SODIUM CHLORIDE 9 MG/ML
1750 INJECTION, SOLUTION INTRAVENOUS ONCE
Refills: 0 | Status: COMPLETED | OUTPATIENT
Start: 2023-05-18 | End: 2023-05-18

## 2023-05-18 RX ORDER — FENTANYL CITRATE 50 UG/ML
25 INJECTION INTRAVENOUS
Refills: 0 | Status: DISCONTINUED | OUTPATIENT
Start: 2023-05-18 | End: 2023-05-18

## 2023-05-18 RX ORDER — ACETAMINOPHEN 500 MG
1000 TABLET ORAL ONCE
Refills: 0 | Status: COMPLETED | OUTPATIENT
Start: 2023-05-18 | End: 2023-05-18

## 2023-05-18 RX ORDER — KETOROLAC TROMETHAMINE 30 MG/ML
15 SYRINGE (ML) INJECTION EVERY 8 HOURS
Refills: 0 | Status: DISCONTINUED | OUTPATIENT
Start: 2023-05-18 | End: 2023-05-19

## 2023-05-18 RX ORDER — ONDANSETRON 8 MG/1
4 TABLET, FILM COATED ORAL ONCE
Refills: 0 | Status: COMPLETED | OUTPATIENT
Start: 2023-05-18 | End: 2023-05-18

## 2023-05-18 RX ORDER — SIMETHICONE 80 MG/1
80 TABLET, CHEWABLE ORAL EVERY 6 HOURS
Refills: 0 | Status: DISCONTINUED | OUTPATIENT
Start: 2023-05-18 | End: 2023-05-20

## 2023-05-18 RX ORDER — VANCOMYCIN HCL 1 G
1000 VIAL (EA) INTRAVENOUS EVERY 12 HOURS
Refills: 0 | Status: DISCONTINUED | OUTPATIENT
Start: 2023-05-19 | End: 2023-05-20

## 2023-05-18 RX ORDER — ONDANSETRON 8 MG/1
8 TABLET, FILM COATED ORAL EVERY 8 HOURS
Refills: 0 | Status: DISCONTINUED | OUTPATIENT
Start: 2023-05-18 | End: 2023-05-20

## 2023-05-18 RX ORDER — IPRATROPIUM/ALBUTEROL SULFATE 18-103MCG
3 AEROSOL WITH ADAPTER (GRAM) INHALATION EVERY 6 HOURS
Refills: 0 | Status: DISCONTINUED | OUTPATIENT
Start: 2023-05-18 | End: 2023-05-20

## 2023-05-18 RX ADMIN — Medication 15 MILLIGRAM(S): at 22:07

## 2023-05-18 RX ADMIN — MORPHINE SULFATE 2 MILLIGRAM(S): 50 CAPSULE, EXTENDED RELEASE ORAL at 18:00

## 2023-05-18 RX ADMIN — ALBUTEROL 2.5 MILLIGRAM(S): 90 AEROSOL, METERED ORAL at 20:23

## 2023-05-18 RX ADMIN — MORPHINE SULFATE 2 MILLIGRAM(S): 50 CAPSULE, EXTENDED RELEASE ORAL at 20:33

## 2023-05-18 RX ADMIN — Medication 3 MILLILITER(S): at 23:59

## 2023-05-18 RX ADMIN — Medication 3 MILLILITER(S): at 16:57

## 2023-05-18 RX ADMIN — PIPERACILLIN AND TAZOBACTAM 200 GRAM(S): 4; .5 INJECTION, POWDER, LYOPHILIZED, FOR SOLUTION INTRAVENOUS at 16:31

## 2023-05-18 RX ADMIN — Medication 1000 MILLIGRAM(S): at 16:52

## 2023-05-18 RX ADMIN — MIDAZOLAM HYDROCHLORIDE 1 MILLIGRAM(S): 1 INJECTION, SOLUTION INTRAMUSCULAR; INTRAVENOUS at 20:29

## 2023-05-18 RX ADMIN — Medication 1000 MILLIGRAM(S): at 22:36

## 2023-05-18 RX ADMIN — Medication 400 MILLIGRAM(S): at 22:06

## 2023-05-18 RX ADMIN — SODIUM CHLORIDE 1750 MILLILITER(S): 9 INJECTION, SOLUTION INTRAVENOUS at 16:31

## 2023-05-18 RX ADMIN — PIPERACILLIN AND TAZOBACTAM 3.38 GRAM(S): 4; .5 INJECTION, POWDER, LYOPHILIZED, FOR SOLUTION INTRAVENOUS at 17:01

## 2023-05-18 RX ADMIN — Medication 125 MILLIGRAM(S): at 20:39

## 2023-05-18 RX ADMIN — ONDANSETRON 4 MILLIGRAM(S): 8 TABLET, FILM COATED ORAL at 18:07

## 2023-05-18 RX ADMIN — Medication 1000 MILLIGRAM(S): at 18:31

## 2023-05-18 RX ADMIN — MIDAZOLAM HYDROCHLORIDE 1 MILLIGRAM(S): 1 INJECTION, SOLUTION INTRAMUSCULAR; INTRAVENOUS at 20:36

## 2023-05-18 RX ADMIN — SODIUM CHLORIDE 1750 MILLILITER(S): 9 INJECTION, SOLUTION INTRAVENOUS at 17:31

## 2023-05-18 RX ADMIN — MORPHINE SULFATE 2 MILLIGRAM(S): 50 CAPSULE, EXTENDED RELEASE ORAL at 18:31

## 2023-05-18 RX ADMIN — MORPHINE SULFATE 2 MILLIGRAM(S): 50 CAPSULE, EXTENDED RELEASE ORAL at 20:48

## 2023-05-18 RX ADMIN — Medication 400 MILLIGRAM(S): at 16:37

## 2023-05-18 RX ADMIN — PIPERACILLIN AND TAZOBACTAM 25 GRAM(S): 4; .5 INJECTION, POWDER, LYOPHILIZED, FOR SOLUTION INTRAVENOUS at 22:35

## 2023-05-18 RX ADMIN — Medication 250 MILLIGRAM(S): at 18:05

## 2023-05-18 NOTE — H&P ADULT - NSHPLABSRESULTS_GEN_ALL_CORE
13.4   21.20 )-----------( 333      ( 18 May 2023 16:35 )             39.8     05-18    133<L>  |  95<L>  |  8.7  ----------------------------<  85  3.4<L>   |  20.0<L>  |  0.75    Ca    9.7      18 May 2023 16:35    TPro  7.2  /  Alb  4.5  /  TBili  0.4  /  DBili  x   /  AST  17  /  ALT  11  /  AlkPhos  40  05-18    Blood Gas Venous - Lactate: 1.10 mmol/L (05.18.23 @ 16:35)

## 2023-05-18 NOTE — ED ADULT NURSE NOTE - NSFALLUNIVINTERV_ED_ALL_ED
Bed/Stretcher in lowest position, wheels locked, appropriate side rails in place/Call bell, personal items and telephone in reach/Instruct patient to call for assistance before getting out of bed/chair/stretcher/Non-slip footwear applied when patient is off stretcher/[place] to call system/Physically safe environment - no spills, clutter or unnecessary equipment/Purposeful proactive rounding/Room/bathroom lighting operational, light cord in reach

## 2023-05-18 NOTE — ED PROVIDER NOTE - PHYSICAL EXAMINATION
VITAL SIGNS: I have reviewed vital signs  CONSTITUTIONAL:  in no acute distress.  SKIN: Warm, dry, no rash.  HEAD: Normocephalic, atraumatic.  EYES: PERRL, conjunctiva and sclera clear.  ENT: pink & moist mucosa, no pharyngeal erythema  NECK: Supple, non tender. No cervical lymphadenopathy.  CARD: +TACHY, no murmurs. Sinus.   RESP: No wheezes, rales or rhonchi.   ABD:  soft, non-distended, non-tender.   MSK:  Good ROM in upper/lower extremities w/o pain.   NEURO: Alert, oriented. Grossly unremarkable. No focal deficits.   PSYCH: Cooperative, alert & oriented x3

## 2023-05-18 NOTE — ED PROVIDER NOTE - CLINICAL SUMMARY MEDICAL DECISION MAKING FREE TEXT BOX
ASSESSMENT:   VIRGEN CAVAZOS is a 38yo F who presents 14 weeks pregnant w/ fever, chills, low back pain x 1 days. Vital consistent w/ sepsis, no shock. Physical exam non contributory. Labs pending.    PLAN: Sepsis w/ possible septic . Screen for concurrent uti, chest infection. Fever control. Empiric ABX. OB consult given the need for possible surgery to obtain source control. ASSESSMENT:   VIRGEN CAVAZOS is a 36yo F who presents 14 weeks pregnant w/ fever, chills, low back pain x 1 days. Vital consistent w/ sepsis, no shock. Physical exam non contributory. Labs pending.    PLAN: Sepsis w/ possible septic . Screen for concurrent uti, chest infection. Iv fluids and Fever control. Empiric ABX. OB consult given the need for possible surgery to obtain source control.

## 2023-05-18 NOTE — ED ADULT NURSE NOTE - OBJECTIVE STATEMENT
14 weeks pregnant. sent in from OBGYN for no fetal heartbeat. c/o HA, abdominal pain and chills and fever since this morning  Pt endorsing lower back pain and some brown spotting .  Pt seen and eval by provider , IV established , medicated as per MD orders

## 2023-05-18 NOTE — DISCUSSION/SUMMARY
[FreeTextEntry1] : We had the pleasure of seeing your patient for a Maternal-Fetal Medicine consultation today. She is a 38 year-old  at 14 1/7 weeks of gestation with a pregnancy complicated by the below issues.\par \par She was extensively counseled regarding the following issues:\par \par On examination here, patient has heart rate 110s-120s and temp 100.2. Suspected intraamniotic infection. The patient had a chorionic villous sampling procedure two days ago and developed fever, tachycardia, uterine tenderness, cramping, back pain and headache. I explained to the patient that this complication is thought to occur in 1 in 1000 procedures. The differential also includes urinary tract infection but that is less likely given no dysuria or urinary frequency. Recent FISH was positive for trisomy 21 in fetus. Patient had D&E scheduled for next week with Dr. Dixon. With new findings, patient sent to ED at [facility] [place] with plan for urgent uterine evacuation today.\par \par \par Thank you for requesting a consultation on this patient. The total time spent in preparation for this visit, medical history taking, orders, review of records, counseling the patient, and writing this note was 45 minutes.\par \par At the end of our discussion, the patient indicated that her questions were answered and she seemed satisfied with our discussion. Please do not hesitate to contact us with any questions.\par \par Sincerely,\par \par \par Jackson Cespedes MD, ENEDINA\par Attending Physician, Maternal-Fetal Medicine\par \par

## 2023-05-18 NOTE — ED ADULT NURSE REASSESSMENT NOTE - NS ED NURSE REASSESS COMMENT FT1
Patient reports chest discomfort and asthma symptoms, states hx of asthma with daily inhaler use.  Obtained order for nebulizer. OB at bedside.

## 2023-05-18 NOTE — H&P ADULT - ASSESSMENT
36yo  at 14w1d by embryo transfer (ALBER 11/15) presenting today for suspected septic AB  - Patient is febrile and tachy in ED  - Started on Vanc and Zosyn, IVF bolus given, sepsis workup initiated  - Missed ab confirmed on US today  - Plan is for emergent add on to OR for suction D&C    Plan d/w Dr. Dixon

## 2023-05-18 NOTE — H&P ADULT - ATTENDING COMMENTS
Pt seen and examined. Tachycardic, febrile and WBC 20 consistent with septic  s/p CVS. Discussed risks of dilation and aspiration, possible dilation and evacuation including but not limited to bleeding, infection, perforation with need for laparoscopy/lapartomy and hysterectomy in case of life threatening bleeding. Pt understands and accepts these risks. She understands urgency of situation. OR alerted for emergent suction dc.  Support resources and recovery sheet given and reviewed.

## 2023-05-18 NOTE — ED PROVIDER NOTE - NS ED ROS FT
Review of Systems  CONSTITUTIONAL: +FEVER, CHILLS  SKIN: warm, dry w/ no rash or bleeding  EYES: no changes to vision  ENT: no changes in hearing, no sore throat  RESPIRATORY: no cough or SOB  CARDIAC: no chest pain & no palpitations  GI: no abd pain, nausea, vomiting, diarrhea, constipation, or blood in stool/colt blood  GENITO-URINARY: no discharge, dysuria or hematuria,   MUSCULOSKELETAL:  +LOW BACK PAIN  NEUROLOGIC: +HEADACHE  PSYCH: no anxiety, non suicidal, non homicidal, without hallucinations or depression

## 2023-05-18 NOTE — CHART NOTE - NSCHARTNOTEFT_GEN_A_CORE
Called by PACU regarding patient being tachycardic to the 150s and feeling short of breath and tachypneic at bedside. Anesthesia was at bedside; duonebs administered and esmolol administered. Patient's vitals improving and symptomatically improved when evaluated at the bedside. Bleeding stable. Temp 103. Ofirmev ordered for temperature. IVF increased to 125cc/h. Broad spectrum antibiotics ordered and to be continued until 48h afebrile. Patient to be transferred to a telebed when available.     D/w Dr. Dixon

## 2023-05-18 NOTE — H&P ADULT - NSHPPHYSICALEXAM_GEN_ALL_CORE
Vital Signs Last 24 Hrs  T(C): 39 (18 May 2023 16:00), Max: 39 (18 May 2023 16:00)  T(F): 102.2 (18 May 2023 16:00), Max: 102.2 (18 May 2023 16:00)  HR: 120 (18 May 2023 16:26) (120 - 136)  BP: 121/67 (18 May 2023 16:26) (111/71 - 121/67)  BP(mean): --  RR: 18 (18 May 2023 16:26) (18 - 18)  SpO2: 96% (18 May 2023 16:26) (96% - 98%)    Parameters below as of 18 May 2023 16:26  Patient On (Oxygen Delivery Method): room air    Physical Exam  General: Alert and oriented x3, NAD, warm to touch  Heart: RRR  Lungs: CTAB  Abd: Soft, TTP

## 2023-05-18 NOTE — H&P ADULT - HISTORY OF PRESENT ILLNESS
38yo  at 14w1d by embryo transfer (ALBER 11/15) presenting today for suspected septic AB. Patient is s/p CVS 2 days ago for abnormal genetic testing. CVS with FISH showing T21. Was planned for a D&E with Dr. Dixon next week however called the [facility] office today due to feeling sick with abdominal pain. She came to the [facility] office where she was found to have fundal tenderness, fever and tachycardia. US performed showing no FH. She was sent emergently to [facility] for suction D&C.    Medhx: Asthma, hypothyroidism  Surghx: Tonsillectomy  Meds: synthroid, albuterol prn  All: NKDA

## 2023-05-18 NOTE — ED ADULT NURSE NOTE - NS ED NURSE LEVEL OF CONSCIOUSNESS AFFECT
Pt called and stated she got most of the other medications she asked for a refill on but now she needs a refill for the losartan (COZAAR) 50 mg tablet and I let her know that I would go ahead and put a message in and she should be able to get in 24-48 hours. Please advise. Calm

## 2023-05-18 NOTE — BRIEF OPERATIVE NOTE - NSICDXBRIEFPROCEDURE_GEN_ALL_CORE_FT
PROCEDURES:  Dilation and curettage, uterus, using suction, with US guidance 18-May-2023 19:55:23  Magdalena Daniel

## 2023-05-18 NOTE — PATIENT PROFILE ADULT - FALL HARM RISK - RISK INTERVENTIONS
Assistance OOB with selected safe patient handling equipment/Assistance with ambulation/Communicate Fall Risk and Risk Factors to all staff, patient, and family/Monitor gait and stability/Reinforce activity limits and safety measures with patient and family/Sit up slowly, dangle for a short time, stand at bedside before walking/Use of alarms - bed, chair and/or voice tab/Visual Cue: Yellow wristband/Bed in lowest position, wheels locked, appropriate side rails in place/Call bell, personal items and telephone in reach/Instruct patient to call for assistance before getting out of bed or chair/Non-slip footwear when patient is out of bed/[place] to call system/Physically safe environment - no spills, clutter or unnecessary equipment/Purposeful Proactive Rounding/Room/bathroom lighting operational, light cord in reach

## 2023-05-18 NOTE — ED PROVIDER NOTE - PROGRESS NOTE DETAILS
Dima: PT c/o chest pressure. States it feels like asthma. Mild wheezing on exam, no rash, airway edema. No abdominal pain. Maintaining sats. Will provided duonebs. Dima: OB consulted for probable septic  given fever, tachycardia in setting of not fetal heart rate.

## 2023-05-18 NOTE — ED PROVIDER NOTE - CADM POA URETHRAL CATHETER
Judith from [place] needs the referral to Dermatology on [address] for her to set up the appointment.    Call her at .   No

## 2023-05-18 NOTE — H&P ADULT - NSCORESITESY/N_GEN_A_CORE_RD
How Severe Is Your Skin Lesion?: moderate Have Your Skin Lesions Been Treated?: not been treated Is This A New Presentation, Or A Follow-Up?: Skin Lesion No

## 2023-05-18 NOTE — ED PROVIDER NOTE - OBJECTIVE STATEMENT
VIRGEN CAVAZOS is a 38yo Female with no PMH  who presents c/o fevers, low back pain x 1 day. PT states she was at her ob/gyn office for a follow up appointment s/p chorionic villous sampling two days ago and was found to have no fetal heart rate. PT denies any symptoms of cp/sob, nausea, vomiting, abdominal pain, urinary symptoms, weakness, confusion.

## 2023-05-18 NOTE — ED PROVIDER NOTE - ATTENDING CONTRIBUTION TO CARE
I, Jd Macario, personally saw the patient with the resident, and completed the key components of the history and physical exam. I then discussed the management plan with the resident.    Upon my evaluation, this patient had a high probability of imminent or life-threatening deterioration due to septic  , which required my direct attention, intervention, and personal management.    36 yo F 14 weeks pregnant via IVF transfer p/w fever and abdominal pain. Patient had CVS 2 days prior and found to have T21. Patient was seen in the office today and fond no fetal HR. Patient denies vaginal discharge. Patient found febril and tachycardic. IVF, tylenol, vanc and zosyn given for sepsis. OB consulted. they will take patient for D&C.     I have personally provided  35 minutes of critical care time exclusive of time spent on separately billable procedures.  Time includes review of laboratory data, radiology results, discussion with consultants, and monitoring for potential decompensation.  Interventions were performed as documented.

## 2023-05-19 LAB
BASOPHILS # BLD AUTO: 0 K/UL — SIGNIFICANT CHANGE UP (ref 0–0.2)
BASOPHILS NFR BLD AUTO: 0 % — SIGNIFICANT CHANGE UP (ref 0–2)
EOSINOPHIL # BLD AUTO: 0 K/UL — SIGNIFICANT CHANGE UP (ref 0–0.5)
EOSINOPHIL NFR BLD AUTO: 0 % — SIGNIFICANT CHANGE UP (ref 0–6)
HCT VFR BLD CALC: 29.8 % — LOW (ref 34.5–45)
HGB BLD-MCNC: 9.8 G/DL — LOW (ref 11.5–15.5)
LYMPHOCYTES # BLD AUTO: 0.23 K/UL — LOW (ref 1–3.3)
LYMPHOCYTES # BLD AUTO: 0.9 % — LOW (ref 13–44)
MANUAL SMEAR VERIFICATION: SIGNIFICANT CHANGE UP
MCHC RBC-ENTMCNC: 29 PG — SIGNIFICANT CHANGE UP (ref 27–34)
MCHC RBC-ENTMCNC: 32.9 GM/DL — SIGNIFICANT CHANGE UP (ref 32–36)
MCV RBC AUTO: 88.2 FL — SIGNIFICANT CHANGE UP (ref 80–100)
MONOCYTES # BLD AUTO: 0 K/UL — SIGNIFICANT CHANGE UP (ref 0–0.9)
MONOCYTES NFR BLD AUTO: 0 % — LOW (ref 2–14)
NEUTROPHILS # BLD AUTO: 24.91 K/UL — HIGH (ref 1.8–7.4)
NEUTROPHILS NFR BLD AUTO: 98.2 % — HIGH (ref 43–77)
NEUTS BAND # BLD: 0.9 % — SIGNIFICANT CHANGE UP (ref 0–8)
PLAT MORPH BLD: NORMAL — SIGNIFICANT CHANGE UP
PLATELET # BLD AUTO: 239 K/UL — SIGNIFICANT CHANGE UP (ref 150–400)
POLYCHROMASIA BLD QL SMEAR: SLIGHT — SIGNIFICANT CHANGE UP
RBC # BLD: 3.38 M/UL — LOW (ref 3.8–5.2)
RBC # FLD: 13 % — SIGNIFICANT CHANGE UP (ref 10.3–14.5)
RBC BLD AUTO: ABNORMAL
WBC # BLD: 25.14 K/UL — HIGH (ref 3.8–10.5)
WBC # FLD AUTO: 25.14 K/UL — HIGH (ref 3.8–10.5)

## 2023-05-19 RX ORDER — ACETAMINOPHEN 500 MG
975 TABLET ORAL ONCE
Refills: 0 | Status: COMPLETED | OUTPATIENT
Start: 2023-05-19 | End: 2023-05-19

## 2023-05-19 RX ORDER — LEVOTHYROXINE SODIUM 125 MCG
50 TABLET ORAL DAILY
Refills: 0 | Status: DISCONTINUED | OUTPATIENT
Start: 2023-05-19 | End: 2023-05-20

## 2023-05-19 RX ADMIN — Medication 15 MILLIGRAM(S): at 05:42

## 2023-05-19 RX ADMIN — Medication 975 MILLIGRAM(S): at 06:18

## 2023-05-19 RX ADMIN — Medication 3 MILLILITER(S): at 08:34

## 2023-05-19 RX ADMIN — Medication 50 MICROGRAM(S): at 05:42

## 2023-05-19 RX ADMIN — Medication 3 MILLILITER(S): at 21:13

## 2023-05-19 RX ADMIN — Medication 975 MILLIGRAM(S): at 06:00

## 2023-05-19 RX ADMIN — Medication 250 MILLIGRAM(S): at 05:43

## 2023-05-19 RX ADMIN — Medication 975 MILLIGRAM(S): at 00:57

## 2023-05-19 RX ADMIN — Medication 975 MILLIGRAM(S): at 17:30

## 2023-05-19 RX ADMIN — Medication 975 MILLIGRAM(S): at 05:52

## 2023-05-19 RX ADMIN — Medication 975 MILLIGRAM(S): at 05:42

## 2023-05-19 RX ADMIN — PIPERACILLIN AND TAZOBACTAM 25 GRAM(S): 4; .5 INJECTION, POWDER, LYOPHILIZED, FOR SOLUTION INTRAVENOUS at 07:37

## 2023-05-19 RX ADMIN — Medication 975 MILLIGRAM(S): at 00:01

## 2023-05-19 RX ADMIN — PIPERACILLIN AND TAZOBACTAM 25 GRAM(S): 4; .5 INJECTION, POWDER, LYOPHILIZED, FOR SOLUTION INTRAVENOUS at 13:17

## 2023-05-19 RX ADMIN — Medication 3 MILLILITER(S): at 00:00

## 2023-05-19 RX ADMIN — Medication 975 MILLIGRAM(S): at 18:00

## 2023-05-19 RX ADMIN — Medication 250 MILLIGRAM(S): at 17:30

## 2023-05-19 RX ADMIN — Medication 975 MILLIGRAM(S): at 13:17

## 2023-05-19 RX ADMIN — Medication 975 MILLIGRAM(S): at 13:31

## 2023-05-19 RX ADMIN — Medication 15 MILLIGRAM(S): at 06:02

## 2023-05-19 RX ADMIN — Medication 15 MILLIGRAM(S): at 13:31

## 2023-05-19 RX ADMIN — PIPERACILLIN AND TAZOBACTAM 25 GRAM(S): 4; .5 INJECTION, POWDER, LYOPHILIZED, FOR SOLUTION INTRAVENOUS at 21:43

## 2023-05-19 RX ADMIN — Medication 3 MILLILITER(S): at 14:33

## 2023-05-19 RX ADMIN — Medication 15 MILLIGRAM(S): at 13:17

## 2023-05-19 NOTE — PROGRESS NOTE ADULT - SUBJECTIVE AND OBJECTIVE BOX
VIRGEN CAVAZOS is a 37y now POD#1 s/p suction D&C for septic  at 14w1d.    S:    No acute events overnight.   Patient was seen and examined at bedside.   Patient has no complaints this AM. Reports she feels better this morning.  Pain is well controlled with current treatment regimen.   Tolerating regular diet, denies N/V.   Ambulating without difficulty.   + flatus/-BM/+ voiding  She denies lightheadedness, dizziness, palpitations, chest pain and SOB.     O:   T(C): 36.5 (23 @ 04:14), Max: 39.4 (23 @ 21:00)  HR: 87 (23 @ 04:14) (72 - 150)  BP: 96/58 (23 @ 04:14) (94/52 - 171/149)  RR: 17 (23 @ 04:14) (14 - 29)  SpO2: 98% (23 @ 04:14) (96% - 100%)    Gen: NAD, AAOx3  CV: RRR, S1/S2 present  Pulm: CTAB  Abdomen: Soft, nondistended, appropriately tender, + BS   Pelvic: Pad inspected with minimal amount of dark red blood   Extremities: No calf tenderness or edema     Labs:       23 @ 07:01  -  23 @ 07:00  --------------------------------------------------------  IN: 0 mL / OUT: 1450 mL / NET: -1450 mL

## 2023-05-19 NOTE — PROGRESS NOTE ADULT - ATTENDING COMMENTS
Pt reports feeling "great". Abdominal exam benign and no VB since this AM. We discussed bleeding can come in ebbs and flows and not to be alarmed if it gets heavier with increasing physical activity. All questions answered. Pt appreciative.

## 2023-05-20 ENCOUNTER — TRANSCRIPTION ENCOUNTER (OUTPATIENT)
Age: 38
End: 2023-05-20

## 2023-05-20 VITALS
TEMPERATURE: 98 F | DIASTOLIC BLOOD PRESSURE: 66 MMHG | RESPIRATION RATE: 18 BRPM | SYSTOLIC BLOOD PRESSURE: 97 MMHG | OXYGEN SATURATION: 98 % | HEART RATE: 84 BPM

## 2023-05-20 LAB
ANION GAP SERPL CALC-SCNC: 13 MMOL/L — SIGNIFICANT CHANGE UP (ref 5–17)
BUN SERPL-MCNC: 12.8 MG/DL — SIGNIFICANT CHANGE UP (ref 8–20)
CALCIUM SERPL-MCNC: 7.9 MG/DL — LOW (ref 8.4–10.5)
CHLORIDE SERPL-SCNC: 108 MMOL/L — SIGNIFICANT CHANGE UP (ref 96–108)
CO2 SERPL-SCNC: 18 MMOL/L — LOW (ref 22–29)
CREAT SERPL-MCNC: 0.69 MG/DL — SIGNIFICANT CHANGE UP (ref 0.5–1.3)
CULTURE RESULTS: NO GROWTH — SIGNIFICANT CHANGE UP
EGFR: 113 ML/MIN/1.73M2 — SIGNIFICANT CHANGE UP
GLUCOSE SERPL-MCNC: 117 MG/DL — HIGH (ref 70–99)
HCT VFR BLD CALC: 28.1 % — LOW (ref 34.5–45)
HGB BLD-MCNC: 9.2 G/DL — LOW (ref 11.5–15.5)
MCHC RBC-ENTMCNC: 28.8 PG — SIGNIFICANT CHANGE UP (ref 27–34)
MCHC RBC-ENTMCNC: 32.7 GM/DL — SIGNIFICANT CHANGE UP (ref 32–36)
MCV RBC AUTO: 87.8 FL — SIGNIFICANT CHANGE UP (ref 80–100)
PLATELET # BLD AUTO: 232 K/UL — SIGNIFICANT CHANGE UP (ref 150–400)
POTASSIUM SERPL-MCNC: 3.1 MMOL/L — LOW (ref 3.5–5.3)
POTASSIUM SERPL-SCNC: 3.1 MMOL/L — LOW (ref 3.5–5.3)
RBC # BLD: 3.2 M/UL — LOW (ref 3.8–5.2)
RBC # FLD: 13.2 % — SIGNIFICANT CHANGE UP (ref 10.3–14.5)
SODIUM SERPL-SCNC: 138 MMOL/L — SIGNIFICANT CHANGE UP (ref 135–145)
SPECIMEN SOURCE: SIGNIFICANT CHANGE UP
VANCOMYCIN TROUGH SERPL-MCNC: 8.7 UG/ML — LOW (ref 10–20)
WBC # BLD: 17.59 K/UL — HIGH (ref 3.8–10.5)
WBC # FLD AUTO: 17.59 K/UL — HIGH (ref 3.8–10.5)

## 2023-05-20 PROCEDURE — 94640 AIRWAY INHALATION TREATMENT: CPT

## 2023-05-20 PROCEDURE — 86850 RBC ANTIBODY SCREEN: CPT

## 2023-05-20 PROCEDURE — 96375 TX/PRO/DX INJ NEW DRUG ADDON: CPT

## 2023-05-20 PROCEDURE — 80202 ASSAY OF VANCOMYCIN: CPT

## 2023-05-20 PROCEDURE — 80048 BASIC METABOLIC PNL TOTAL CA: CPT

## 2023-05-20 PROCEDURE — 99285 EMERGENCY DEPT VISIT HI MDM: CPT | Mod: 25

## 2023-05-20 PROCEDURE — 87040 BLOOD CULTURE FOR BACTERIA: CPT

## 2023-05-20 PROCEDURE — 83605 ASSAY OF LACTIC ACID: CPT

## 2023-05-20 PROCEDURE — 0225U NFCT DS DNA&RNA 21 SARSCOV2: CPT

## 2023-05-20 PROCEDURE — 85025 COMPLETE CBC W/AUTO DIFF WBC: CPT

## 2023-05-20 PROCEDURE — 87086 URINE CULTURE/COLONY COUNT: CPT

## 2023-05-20 PROCEDURE — 93005 ELECTROCARDIOGRAM TRACING: CPT

## 2023-05-20 PROCEDURE — 85384 FIBRINOGEN ACTIVITY: CPT

## 2023-05-20 PROCEDURE — 88305 TISSUE EXAM BY PATHOLOGIST: CPT

## 2023-05-20 PROCEDURE — 85610 PROTHROMBIN TIME: CPT

## 2023-05-20 PROCEDURE — 96365 THER/PROPH/DIAG IV INF INIT: CPT

## 2023-05-20 PROCEDURE — 71045 X-RAY EXAM CHEST 1 VIEW: CPT

## 2023-05-20 PROCEDURE — 86901 BLOOD TYPING SEROLOGIC RH(D): CPT

## 2023-05-20 PROCEDURE — 80053 COMPREHEN METABOLIC PANEL: CPT

## 2023-05-20 PROCEDURE — 85730 THROMBOPLASTIN TIME PARTIAL: CPT

## 2023-05-20 PROCEDURE — 36415 COLL VENOUS BLD VENIPUNCTURE: CPT

## 2023-05-20 PROCEDURE — 86900 BLOOD TYPING SEROLOGIC ABO: CPT

## 2023-05-20 PROCEDURE — 81001 URINALYSIS AUTO W/SCOPE: CPT

## 2023-05-20 PROCEDURE — 85027 COMPLETE CBC AUTOMATED: CPT

## 2023-05-20 RX ORDER — FAMOTIDINE 10 MG/ML
20 INJECTION INTRAVENOUS ONCE
Refills: 0 | Status: COMPLETED | OUTPATIENT
Start: 2023-05-20 | End: 2023-05-20

## 2023-05-20 RX ORDER — IBUPROFEN 200 MG
1 TABLET ORAL
Qty: 20 | Refills: 0
Start: 2023-05-20 | End: 2023-05-24

## 2023-05-20 RX ORDER — CALCIUM CARBONATE 500(1250)
1 TABLET ORAL
Refills: 0 | Status: DISCONTINUED | OUTPATIENT
Start: 2023-05-20 | End: 2023-05-20

## 2023-05-20 RX ORDER — ACETAMINOPHEN 500 MG
2 TABLET ORAL
Qty: 40 | Refills: 0
Start: 2023-05-20 | End: 2023-05-24

## 2023-05-20 RX ORDER — SENNA PLUS 8.6 MG/1
2 TABLET ORAL AT BEDTIME
Refills: 0 | Status: DISCONTINUED | OUTPATIENT
Start: 2023-05-20 | End: 2023-05-20

## 2023-05-20 RX ORDER — METRONIDAZOLE 500 MG
1 TABLET ORAL
Qty: 14 | Refills: 0
Start: 2023-05-20 | End: 2023-05-26

## 2023-05-20 RX ADMIN — PIPERACILLIN AND TAZOBACTAM 25 GRAM(S): 4; .5 INJECTION, POWDER, LYOPHILIZED, FOR SOLUTION INTRAVENOUS at 05:58

## 2023-05-20 RX ADMIN — FAMOTIDINE 20 MILLIGRAM(S): 10 INJECTION INTRAVENOUS at 12:55

## 2023-05-20 RX ADMIN — Medication 250 MILLIGRAM(S): at 10:35

## 2023-05-20 RX ADMIN — Medication 975 MILLIGRAM(S): at 01:00

## 2023-05-20 RX ADMIN — PIPERACILLIN AND TAZOBACTAM 25 GRAM(S): 4; .5 INJECTION, POWDER, LYOPHILIZED, FOR SOLUTION INTRAVENOUS at 15:33

## 2023-05-20 RX ADMIN — Medication 975 MILLIGRAM(S): at 00:07

## 2023-05-20 RX ADMIN — Medication 3 MILLILITER(S): at 08:27

## 2023-05-20 RX ADMIN — Medication 3 MILLILITER(S): at 04:31

## 2023-05-20 RX ADMIN — Medication 3 MILLILITER(S): at 15:25

## 2023-05-20 RX ADMIN — Medication 50 MICROGRAM(S): at 05:57

## 2023-05-20 NOTE — DISCHARGE NOTE NURSING/CASE MANAGEMENT/SOCIAL WORK - PATIENT PORTAL LINK FT
You can access the FollowMyHealth Patient Portal offered by [facility] by registering at the following website: [email or web address]. By joining [employer]’s FollowMyHealth portal, you will also be able to view your health information using other applications (apps) compatible with our system.

## 2023-05-20 NOTE — DISCHARGE NOTE PROVIDER - CARE PROVIDER_API CALL
Liliam Dixon (MD; MPH)  Complex Family Planning; Obstetrics and Gynecology  [address], [address]  [place] [address]  Phone: [phone]  Fax: [phone]  Follow Up Time: 2 weeks

## 2023-05-20 NOTE — DISCHARGE NOTE PROVIDER - NSDCMRMEDTOKEN_GEN_ALL_CORE_FT
acetaminophen 500 mg oral tablet: 2 tab(s) orally every 6 hours  doxycycline hyclate 100 mg oral capsule: 1 cap(s) orally 2 times a day MDD: 2  ibuprofen 600 mg oral tablet: 1 tab(s) orally every 6 hours  metroNIDAZOLE 500 mg oral tablet: 1 tab(s) orally 2 times a day

## 2023-05-20 NOTE — DISCHARGE NOTE PROVIDER - NSDCCPTREATMENT_GEN_ALL_CORE_FT
PRINCIPAL PROCEDURE  Procedure: Dilation and curettage, uterus, using suction, with US guidance  Findings and Treatment:

## 2023-05-20 NOTE — PROGRESS NOTE ADULT - ASSESSMENT
VIRGEN CAVAZOS is a 37y now POD#1 s/p suction D&C for septic  at 14w1d.    A/P:   Neuro: Pain well controlled. Continue current pain regimen.  CV: No history of cardiovascular disease. Blood pressure well controlled.  Pulm: History of asthma. Albuterol prn ordered. Saturating well on room air. Incentive spirometer use encouraged  GI: No active disease. Bowel sounds and function normal, tolerating PO diet. Continue current bowel regimen.   : [place] removed, voiding spontaneously.  Heme: Hgb: 13.4 and WBC: 21.20. AM labs pending  ID: Last febrile at 9:00pm  to 103F. Patient continued to be afebrile overnight. Continue with Zosyn for a total of at least 48 hours. Will consider transitioning to PO antibiotics after.  Endo: History of hypothyroidism. Home dose of levothyroxine ordered.   FEN: IVF discontinued. Tolerating PO. Electrolytes WNL. AM labs pending.   DVT ppx: Ambulation encouraged, SCDs when in bed, lovenox ordered.  Dispo: Continue with IV antiobiotics today  
PLAN:  Neuro: Pain well controlled. Continue current pain regimen.  CV: No history of cardiovascular disease. Blood pressure well controlled.  Pulm: History of asthma. Albuterol prn ordered. Saturating 100% on RA. Incentive spirometer use encouraged  GI: No active disease. Bowel sounds and function normal, tolerating PO diet. Continue current bowel regimen.   : Voiding spontaneously.   Heme: Hgb: 13.4 and WBC: 21.20. AM labs pending  ID: WBC: 25.1> 17.59. Afebrile overnight, Tmax: 103F. 5/18/23, @2100. P Continue with Zosyn for a total of at least 48 hours from last fever. Will transition to PO antibiotics on 5/21.  Endo: History of hypothyroidism. Home dose of levothyroxine ordered.   FEN: IVF discontinued. Tolerating PO. Electrolytes WNL. AM labs pending.   DVT ppx: Ambulation encouraged, SCDs when in bed, lovenox ordered.  Dispo: Continue with IV antiobiotics today. Continue routine inpatient management.

## 2023-05-20 NOTE — PROGRESS NOTE ADULT - SUBJECTIVE AND OBJECTIVE BOX
VIRGEN CAVAZOS is a 37y now POD#2 s/p suction D&C for septic  at 14w1d.      S:    Patient was seen and examined at bedside.   Pain is controlled with PRN medication   Tolerating regular diet, denies N/V.   Voiding spontaneously. Ambulating without difficulty  Reporting hard stools, requesting stool softener  Reports minimal vaginal bleeding.    O:   T(C): 36.5 (23 @ 05:00), Max: 36.5 (23 @ 08:32)  HR: 83 (23 @ 05:00) (62 - 92)  BP: 100/62 (23 @ 05:00) (91/56 - 100/67)  RR: 18 (23 @ 05:00) (17 - 18)  SpO2: 100% (23 @ 05:00) (98% - 100%)    PE:  CV: RRR  RESP: CTA B/L  Abdomen: , + BS, Soft, Appropriately tender, Negative rebound or guarding appreciated  Incision: clean dry and intact  Extrem: no calf tenderness or edema       Labs:      VIRGEN CAVAZOS is a 37y now POD#2 s/p suction D&C for septic  at 14w1d.      S:    Patient was seen and examined at bedside.   Pain is controlled with PRN medication   Tolerating regular diet, denies N/V.   Voiding spontaneously. Ambulating without difficulty  Reporting hard stools, requesting stool softener  Reports minimal vaginal bleeding.    O:   T(C): 36.5 (23 @ 05:00), Max: 36.5 (23 @ 08:32)  HR: 83 (23 @ 05:00) (62 - 92)  BP: 100/62 (23 @ 05:00) (91/56 - 100/67)  RR: 18 (23 @ 05:00) (17 - 18)  SpO2: 100% (23 @ 05:00) (98% - 100%)    PE:  CV: RRR  RESP: CTA B/L  Abdomen: + BS, Soft, Minimal TTP.  Negative rebound or guarding appreciated  VE: Minimal vaginal bleeding noted on pad.   Extrem: no calf tenderness or edema         LABS:                        9.2    17.59 )-----------( 232      ( 20 May 2023 06:06 )             28.1

## 2023-05-20 NOTE — DISCHARGE NOTE PROVIDER - HOSPITAL COURSE
patient is s/p suction D&C in the setting of setting of a septic  after CVS. Postoperative pain is well controlled with PRN medication. She has no difficulty with ambulation, voiding or PO intake. Lab values and vital signs are within normal limits prior to discharge.

## 2023-05-20 NOTE — DISCHARGE NOTE PROVIDER - NSDCFUSCHEDAPPT_GEN_ALL_CORE_FT
Scottie Mueller  [facility] Physician [facility]  OBGYNGEN [address]  Scheduled Appointment: 06/06/2023    Kita Cruz  [facility] Physician [facility]  HUMANREPRO [address]  Scheduled Appointment: 06/08/2023    [facility]  ANTEPARTUM [address]  Scheduled Appointment: 06/13/2023    [facility]  ANTEPARTUM [address]  Scheduled Appointment: 07/11/2023

## 2023-05-20 NOTE — PROGRESS NOTE ADULT - NSPROGADDITIONALINFOA_GEN_ALL_CORE
MFM Fellow Addendum    I agree with the documentation by the resident physician above.  In short, the patient is a 36 y/o POD#2 s/p suction D&C for septic  after recently diagnosed T21 fetus on [facility] and recent urinary catheterization.  She feels much improved today.  She has remained afebrile overnight.  Additionally, her WBC count is downtrending now at 17K. MFM Fellow Addendum    I agree with the documentation by the resident physician above.  In short, the patient is a 36 y/o POD#2 s/p suction D&C for septic  after recently diagnosed T21 fetus on [facility] and recent urinary catheterization.  She feels much improved today.  She has remained afebrile overnight.  Additionally, her WBC count is downtrending now at 17K. h MFM Fellow Addendum    I agree with the documentation by the resident physician above.  In short, the patient is a 38 y/o  POD#2 from suction D&C for septic  after recently diagnosed T21 fetus by FISH after [facility] and recent urinary catheterization.  She feels much improved today.  She has remained afebrile overnight.  Additionally, her WBC count is downtrending now at 17K.  She denies nausea, vomiting, or chills.  She would like to go home.  She is thankful for the care provided and is recovering emotionally from the recent diagnosis.  She has been receiving an IV antibiotic regimen of vancomycin 1 g q 12 hours and zosyn.  She is stable for discharge later today.  She is to receive a 14 day course of PO antibiotics of doxycycline and flagyl.  All of her questions and concerns were answered this morning.    Pavan Forrest MD MFM Fellow Addendum    I agree with the documentation by the resident physician above.  In short, the patient is a 38 y/o  POD#2 from suction D&C for septic  after recently diagnosed T21 fetus by FISH after [facility] and recent urinary catheterization.  She feels much improved today.  She has remained afebrile overnight.  Additionally, her WBC count is downtrending now at 17K.  She denies nausea, vomiting, or chills.  She would like to go home.  She is thankful for the care provided and is recovering emotionally from the recent diagnosis.  She has been receiving an IV antibiotic regimen of vancomycin 1 g q 12 hours and zosyn 4.5 g q 8 hours.  She is stable for discharge later today.  She is to receive a 14 day course of PO doxycycline and flagyl.  All of her questions and concerns were answered this morning.    Pavan Forrest MD MFM Fellow Addendum    I agree with the documentation by the resident physician above.  In short, the patient is a 38 y/o  POD#2 from suction D&C for septic  after recently diagnosed T21 fetus by FISH after [facility] and recent urinary catheterization.  She feels much improved today.  She has remained afebrile overnight.  Additionally, her WBC count is downtrending now at 17K.  She denies nausea, vomiting, or chills.  She would like to go home.  She is thankful for the care provided and is recovering emotionally from the recent diagnosis.  She has been receiving an IV antibiotic regimen of vancomycin 1 g q 12 hours and zosyn 4.5 g q 8 hours.  She is stable for discharge later today.  She is to receive a 14 day course of PO doxycycline 100 mg BID and PO flagyl 500 mg BID.  All of her questions and concerns were answered this morning.    Pavan Forrest MD

## 2023-05-22 ENCOUNTER — APPOINTMENT (OUTPATIENT)
Dept: OBGYN | Facility: CLINIC | Age: 38
End: 2023-05-22

## 2023-05-23 ENCOUNTER — APPOINTMENT (OUTPATIENT)
Dept: OBGYN | Facility: CLINIC | Age: 38
End: 2023-05-23
Payer: COMMERCIAL

## 2023-05-23 VITALS
BODY MASS INDEX: 23.22 KG/M2 | SYSTOLIC BLOOD PRESSURE: 114 MMHG | HEIGHT: 64 IN | WEIGHT: 136 LBS | DIASTOLIC BLOOD PRESSURE: 74 MMHG

## 2023-05-23 LAB
CULTURE RESULTS: SIGNIFICANT CHANGE UP
CULTURE RESULTS: SIGNIFICANT CHANGE UP
SPECIMEN SOURCE: SIGNIFICANT CHANGE UP
SPECIMEN SOURCE: SIGNIFICANT CHANGE UP

## 2023-05-23 PROCEDURE — 99024 POSTOP FOLLOW-UP VISIT: CPT

## 2023-05-23 NOTE — HISTORY OF PRESENT ILLNESS
[FreeTextEntry1] : Patient is presenting s/p d&C on  for septic  s/p [facility] earlier in the week. Patient was given zosyn and vanco in patient and was sent home on 14 day course of flagyl and doxycycline which she is taking. \par \par This pregnancy was T21 confirmed by [facility] - she was planning on terminating this week with Dr. Dixon prior to the septic  diagnosis. \par \par Currently the patient is feeling well - no fevers, no sob, no abdominal pain. Has some headaches but releived with tylenol and rest. She reports minimal spotting.\par \par I have updated her DEANNA about the current events - has appointment 2023\par \par Patient will finish out her antibiotics and she will see me in 4 weeks for pelvic exam and repeat pap/hpv (previous was abnormal last year)\par \par

## 2023-05-24 ENCOUNTER — APPOINTMENT (OUTPATIENT)
Dept: OBGYN | Facility: CLINIC | Age: 38
End: 2023-05-24

## 2023-05-24 LAB — SURGICAL PATHOLOGY STUDY: SIGNIFICANT CHANGE UP

## 2023-06-06 ENCOUNTER — APPOINTMENT (OUTPATIENT)
Dept: OBGYN | Facility: CLINIC | Age: 38
End: 2023-06-06

## 2023-06-08 ENCOUNTER — APPOINTMENT (OUTPATIENT)
Dept: HUMAN REPRODUCTION | Facility: CLINIC | Age: 38
End: 2023-06-08
Payer: COMMERCIAL

## 2023-06-08 PROCEDURE — 99214 OFFICE O/P EST MOD 30 MIN: CPT

## 2023-06-13 ENCOUNTER — APPOINTMENT (OUTPATIENT)
Dept: ANTEPARTUM | Facility: CLINIC | Age: 38
End: 2023-06-13

## 2023-06-20 ENCOUNTER — APPOINTMENT (OUTPATIENT)
Dept: OBGYN | Facility: CLINIC | Age: 38
End: 2023-06-20
Payer: COMMERCIAL

## 2023-06-20 ENCOUNTER — LABORATORY RESULT (OUTPATIENT)
Age: 38
End: 2023-06-20

## 2023-06-20 ENCOUNTER — NON-APPOINTMENT (OUTPATIENT)
Age: 38
End: 2023-06-20

## 2023-06-20 VITALS
BODY MASS INDEX: 22.2 KG/M2 | WEIGHT: 130 LBS | DIASTOLIC BLOOD PRESSURE: 76 MMHG | HEIGHT: 64 IN | SYSTOLIC BLOOD PRESSURE: 122 MMHG

## 2023-06-20 DIAGNOSIS — Z98.890 OTHER SPECIFIED POSTPROCEDURAL STATES: ICD-10-CM

## 2023-06-20 DIAGNOSIS — R87.619 UNSPECIFIED ABNORMAL CYTOLOGICAL FINDINGS IN SPECIMENS FROM CERVIX UTERI: ICD-10-CM

## 2023-06-20 PROCEDURE — 99212 OFFICE O/P EST SF 10 MIN: CPT | Mod: 24

## 2023-06-20 PROCEDURE — 99024 POSTOP FOLLOW-UP VISIT: CPT

## 2023-06-20 NOTE — DISCUSSION/SUMMARY
[FreeTextEntry1] : post op f/u\par doing well physically and emotionally\par no menses yet - advised can take up to 6 weeks\par cotesting sent - will call with results

## 2023-06-20 NOTE — HISTORY OF PRESENT ILLNESS
[FreeTextEntry1] : Patient here for follow up visit and repeat cotesting. I wanted to do pelvic exam after antibiotic therapy finished. She is s/p D&C for septic  after [facility]. Known T21. This was IVF pregnancy -fresh embryo. She is having the rest of her embryos tested prior to implantation\par no menses yet

## 2023-06-29 ENCOUNTER — NON-APPOINTMENT (OUTPATIENT)
Age: 38
End: 2023-06-29

## 2023-06-29 LAB
CYTOLOGY CVX/VAG DOC THIN PREP: ABNORMAL
HPV HIGH+LOW RISK DNA PNL CVX: DETECTED

## 2023-07-06 ENCOUNTER — APPOINTMENT (OUTPATIENT)
Dept: OBGYN | Facility: CLINIC | Age: 38
End: 2023-07-06
Payer: COMMERCIAL

## 2023-07-06 VITALS
SYSTOLIC BLOOD PRESSURE: 116 MMHG | HEIGHT: 64 IN | WEIGHT: 130 LBS | BODY MASS INDEX: 22.2 KG/M2 | DIASTOLIC BLOOD PRESSURE: 77 MMHG

## 2023-07-06 DIAGNOSIS — B97.7 PAPILLOMAVIRUS AS THE CAUSE OF DISEASES CLASSIFIED ELSEWHERE: ICD-10-CM

## 2023-07-06 PROCEDURE — 57454 BX/CURETT OF CERVIX W/SCOPE: CPT

## 2023-07-06 PROCEDURE — 81025 URINE PREGNANCY TEST: CPT

## 2023-07-06 NOTE — PROCEDURE
[Colposcopy] : Colposcopy  [Time out performed] : Pre-procedure time out performed.  Patient's name, date of birth and procedure confirmed. [Consent Obtained] : Consent obtained [Risks] : risks [Benefits] : benefits [Alternatives] : alternatives [Patient] : patient [Infection] : infection [Bleeding] : bleeding [Allergic Reaction] : allergic reaction [HPV High Risk] : HPV high risk [Colposcopy Adequate] : colposcopy adequate [SCI Fully Visualized] : SCI fully visualized [ECC Performed] : ECC performed [Hemostasis Obtained] : Hemostasis obtained [Tolerated Well] : the patient tolerated the procedure well [Pap Performed] : pap not performed [No Abnormalities] : no abnormalities [Biopsy] : biopsy not taken [de-identified] : recurrent [de-identified] : 2 [de-identified] : some acetowhite for 6-7 oclock [de-identified] : 6 and 7 oclock [de-identified] : maybe dorene 1 at most

## 2023-07-07 ENCOUNTER — APPOINTMENT (OUTPATIENT)
Dept: PEDIATRIC CARDIOLOGY | Facility: CLINIC | Age: 38
End: 2023-07-07

## 2023-07-11 ENCOUNTER — APPOINTMENT (OUTPATIENT)
Dept: ANTEPARTUM | Facility: CLINIC | Age: 38
End: 2023-07-11

## 2023-07-12 ENCOUNTER — NON-APPOINTMENT (OUTPATIENT)
Age: 38
End: 2023-07-12

## 2023-07-12 LAB — CORE LAB BIOPSY: NORMAL

## 2023-07-13 PROCEDURE — 89290 BIOPSY OOCYTE POLAR BODY <=5: CPT

## 2023-07-13 PROCEDURE — 89291 BIOPSY OOCYTE POLAR BODY: CPT

## 2023-07-13 PROCEDURE — 89258 CRYOPRESERVATION EMBRYO(S): CPT

## 2023-07-13 PROCEDURE — 89352 THAWING CRYOPRESRVED EMBRYO: CPT

## 2023-07-14 ENCOUNTER — APPOINTMENT (OUTPATIENT)
Dept: HUMAN REPRODUCTION | Facility: CLINIC | Age: 38
End: 2023-07-14
Payer: COMMERCIAL

## 2023-07-28 ENCOUNTER — TRANSCRIPTION ENCOUNTER (OUTPATIENT)
Age: 38
End: 2023-07-28

## 2023-08-29 ENCOUNTER — APPOINTMENT (OUTPATIENT)
Dept: HUMAN REPRODUCTION | Facility: CLINIC | Age: 38
End: 2023-08-29
Payer: COMMERCIAL

## 2023-08-29 ENCOUNTER — NON-APPOINTMENT (OUTPATIENT)
Age: 38
End: 2023-08-29

## 2023-08-29 PROCEDURE — 99213 OFFICE O/P EST LOW 20 MIN: CPT | Mod: 25

## 2023-08-29 PROCEDURE — 76830 TRANSVAGINAL US NON-OB: CPT

## 2023-08-29 PROCEDURE — 36415 COLL VENOUS BLD VENIPUNCTURE: CPT

## 2023-09-07 ENCOUNTER — APPOINTMENT (OUTPATIENT)
Dept: HUMAN REPRODUCTION | Facility: CLINIC | Age: 38
End: 2023-09-07
Payer: COMMERCIAL

## 2023-09-07 PROCEDURE — 58340 CATHETER FOR HYSTEROGRAPHY: CPT

## 2023-09-07 PROCEDURE — 76831 ECHO EXAM UTERUS: CPT

## 2023-09-19 ENCOUNTER — APPOINTMENT (OUTPATIENT)
Dept: HUMAN REPRODUCTION | Facility: CLINIC | Age: 38
End: 2023-09-19
Payer: COMMERCIAL

## 2023-09-19 PROCEDURE — 99213 OFFICE O/P EST LOW 20 MIN: CPT | Mod: 25

## 2023-09-19 PROCEDURE — 36415 COLL VENOUS BLD VENIPUNCTURE: CPT

## 2023-09-19 PROCEDURE — 76857 US EXAM PELVIC LIMITED: CPT

## 2023-09-25 ENCOUNTER — APPOINTMENT (OUTPATIENT)
Dept: HUMAN REPRODUCTION | Facility: CLINIC | Age: 38
End: 2023-09-25
Payer: COMMERCIAL

## 2023-09-25 PROCEDURE — 89255 PREPARE EMBRYO FOR TRANSFER: CPT

## 2023-09-25 PROCEDURE — 58974 EMBRYO TRANSFER INTRAUTERINE: CPT

## 2023-09-25 PROCEDURE — 89398A: CUSTOM

## 2023-09-25 PROCEDURE — 76998 US GUIDE INTRAOP: CPT

## 2023-09-25 PROCEDURE — 89352 THAWING CRYOPRESRVED EMBRYO: CPT

## 2023-09-26 ENCOUNTER — APPOINTMENT (OUTPATIENT)
Dept: HUMAN REPRODUCTION | Facility: CLINIC | Age: 38
End: 2023-09-26
Payer: COMMERCIAL

## 2023-09-29 ENCOUNTER — APPOINTMENT (OUTPATIENT)
Dept: HUMAN REPRODUCTION | Facility: CLINIC | Age: 38
End: 2023-09-29
Payer: COMMERCIAL

## 2023-09-29 PROCEDURE — 36415 COLL VENOUS BLD VENIPUNCTURE: CPT

## 2023-10-04 ENCOUNTER — APPOINTMENT (OUTPATIENT)
Dept: HUMAN REPRODUCTION | Facility: CLINIC | Age: 38
End: 2023-10-04
Payer: COMMERCIAL

## 2023-10-04 PROCEDURE — 36415 COLL VENOUS BLD VENIPUNCTURE: CPT

## 2023-10-06 ENCOUNTER — APPOINTMENT (OUTPATIENT)
Dept: HUMAN REPRODUCTION | Facility: CLINIC | Age: 38
End: 2023-10-06
Payer: COMMERCIAL

## 2023-10-06 PROCEDURE — 36415 COLL VENOUS BLD VENIPUNCTURE: CPT

## 2023-10-16 ENCOUNTER — APPOINTMENT (OUTPATIENT)
Dept: HUMAN REPRODUCTION | Facility: CLINIC | Age: 38
End: 2023-10-16
Payer: COMMERCIAL

## 2023-10-16 PROCEDURE — 76817 TRANSVAGINAL US OBSTETRIC: CPT

## 2023-10-16 PROCEDURE — 99212 OFFICE O/P EST SF 10 MIN: CPT | Mod: 25

## 2023-10-23 ENCOUNTER — APPOINTMENT (OUTPATIENT)
Dept: HUMAN REPRODUCTION | Facility: CLINIC | Age: 38
End: 2023-10-23
Payer: COMMERCIAL

## 2023-10-23 PROCEDURE — 99213 OFFICE O/P EST LOW 20 MIN: CPT | Mod: 25

## 2023-10-23 PROCEDURE — 76817 TRANSVAGINAL US OBSTETRIC: CPT

## 2023-10-30 ENCOUNTER — APPOINTMENT (OUTPATIENT)
Dept: HUMAN REPRODUCTION | Facility: CLINIC | Age: 38
End: 2023-10-30
Payer: COMMERCIAL

## 2023-10-30 PROCEDURE — 76817 TRANSVAGINAL US OBSTETRIC: CPT

## 2023-10-30 PROCEDURE — 99213 OFFICE O/P EST LOW 20 MIN: CPT | Mod: 25

## 2023-10-30 PROCEDURE — 36415 COLL VENOUS BLD VENIPUNCTURE: CPT

## 2023-11-06 ENCOUNTER — APPOINTMENT (OUTPATIENT)
Dept: OBGYN | Facility: CLINIC | Age: 38
End: 2023-11-06
Payer: COMMERCIAL

## 2023-11-06 VITALS
DIASTOLIC BLOOD PRESSURE: 72 MMHG | HEIGHT: 64 IN | BODY MASS INDEX: 23.05 KG/M2 | SYSTOLIC BLOOD PRESSURE: 115 MMHG | WEIGHT: 135 LBS

## 2023-11-06 PROCEDURE — 36415 COLL VENOUS BLD VENIPUNCTURE: CPT

## 2023-11-06 PROCEDURE — 0500F INITIAL PRENATAL CARE VISIT: CPT

## 2023-11-06 PROCEDURE — P9615: CPT

## 2023-11-07 ENCOUNTER — APPOINTMENT (OUTPATIENT)
Dept: ANTEPARTUM | Facility: CLINIC | Age: 38
End: 2023-11-07
Payer: COMMERCIAL

## 2023-11-07 ENCOUNTER — ASOB RESULT (OUTPATIENT)
Age: 38
End: 2023-11-07

## 2023-11-07 LAB
ABO + RH PNL BLD: NORMAL
BASOPHILS # BLD AUTO: 0.09 K/UL
BASOPHILS NFR BLD AUTO: 0.7 %
BLD GP AB SCN SERPL QL: NORMAL
EOSINOPHIL # BLD AUTO: 0.79 K/UL
EOSINOPHIL NFR BLD AUTO: 6 %
FERRITIN SERPL-MCNC: 62 NG/ML
HCT VFR BLD CALC: 43.6 %
HGB BLD-MCNC: 13.2 G/DL
HIV1+2 AB SPEC QL IA.RAPID: NONREACTIVE
IMM GRANULOCYTES NFR BLD AUTO: 0.2 %
LYMPHOCYTES # BLD AUTO: 4.1 K/UL
LYMPHOCYTES NFR BLD AUTO: 31.2 %
MAN DIFF?: NORMAL
MCHC RBC-ENTMCNC: 27.3 PG
MCHC RBC-ENTMCNC: 30.3 GM/DL
MCV RBC AUTO: 90.1 FL
MONOCYTES # BLD AUTO: 1.03 K/UL
MONOCYTES NFR BLD AUTO: 7.8 %
NEUTROPHILS # BLD AUTO: 7.12 K/UL
NEUTROPHILS NFR BLD AUTO: 54.1 %
PLATELET # BLD AUTO: 445 K/UL
RBC # BLD: 4.84 M/UL
RBC # FLD: 13.2 %
WBC # FLD AUTO: 13.15 K/UL

## 2023-11-07 PROCEDURE — 76817 TRANSVAGINAL US OBSTETRIC: CPT

## 2023-11-08 LAB
B19V IGG SER QL IA: NEGATIVE
B19V IGG+IGM SER-IMP: NORMAL
B19V IGM FLD-ACNC: NEGATIVE
BACTERIA UR CULT: NORMAL
C TRACH RRNA SPEC QL NAA+PROBE: NOT DETECTED
CMV IGG SERPL QL: 7.4 U/ML
CMV IGG SERPL-IMP: POSITIVE
CMV IGM SERPL QL: <8 AU/ML
CMV IGM SERPL QL: NEGATIVE
HBV SURFACE AG SER QL: NONREACTIVE
HCV AB SER QL: NONREACTIVE
HCV S/CO RATIO: 0.15 S/CO
LEAD BLD-MCNC: <1 UG/DL
MEV IGG FLD QL IA: 280 AU/ML
MEV IGG+IGM SER-IMP: POSITIVE
N GONORRHOEA RRNA SPEC QL NAA+PROBE: NOT DETECTED
RUBV IGG FLD-ACNC: 15.7 INDEX
RUBV IGG SER-IMP: POSITIVE
SOURCE AMPLIFICATION: NORMAL
T GONDII AB SER-IMP: NEGATIVE
T GONDII AB SER-IMP: NEGATIVE
T GONDII IGG SER QL: <3 IU/ML
T GONDII IGM SER QL: <3 AU/ML
T PALLIDUM AB SER QL IA: NEGATIVE
VZV AB TITR SER: POSITIVE
VZV IGG SER IF-ACNC: 2376 INDEX

## 2023-11-09 ENCOUNTER — NON-APPOINTMENT (OUTPATIENT)
Age: 38
End: 2023-11-09

## 2023-11-09 LAB
M TB IFN-G BLD-IMP: NEGATIVE
QUANTIFERON TB PLUS MITOGEN MINUS NIL: 4.94 IU/ML
QUANTIFERON TB PLUS NIL: 0.03 IU/ML
QUANTIFERON TB PLUS TB1 MINUS NIL: -0.01 IU/ML
QUANTIFERON TB PLUS TB2 MINUS NIL: -0.02 IU/ML

## 2023-11-14 ENCOUNTER — APPOINTMENT (OUTPATIENT)
Dept: OBGYN | Facility: CLINIC | Age: 38
End: 2023-11-14
Payer: COMMERCIAL

## 2023-11-14 VITALS
WEIGHT: 135 LBS | DIASTOLIC BLOOD PRESSURE: 79 MMHG | BODY MASS INDEX: 23.05 KG/M2 | HEIGHT: 64 IN | SYSTOLIC BLOOD PRESSURE: 116 MMHG

## 2023-11-14 DIAGNOSIS — R00.0 TACHYCARDIA, UNSPECIFIED: ICD-10-CM

## 2023-11-14 LAB
BILIRUB UR QL STRIP: NORMAL
CLARITY UR: CLEAR
COLLECTION METHOD: NORMAL
GLUCOSE UR-MCNC: NORMAL
HCG UR QL: 0.2 EU/DL
HGB UR QL STRIP.AUTO: NORMAL
KETONES UR-MCNC: NORMAL
LEUKOCYTE ESTERASE UR QL STRIP: NORMAL
NITRITE UR QL STRIP: NORMAL
PH UR STRIP: 6.5
PROT UR STRIP-MCNC: NORMAL
SP GR UR STRIP: 1.02

## 2023-11-14 PROCEDURE — 0502F SUBSEQUENT PRENATAL CARE: CPT

## 2023-11-15 ENCOUNTER — ASOB RESULT (OUTPATIENT)
Age: 38
End: 2023-11-15

## 2023-11-15 ENCOUNTER — APPOINTMENT (OUTPATIENT)
Dept: MATERNAL FETAL MEDICINE | Facility: CLINIC | Age: 38
End: 2023-11-15
Payer: COMMERCIAL

## 2023-11-15 PROCEDURE — 99212 OFFICE O/P EST SF 10 MIN: CPT | Mod: 95

## 2023-11-16 ENCOUNTER — NON-APPOINTMENT (OUTPATIENT)
Age: 38
End: 2023-11-16

## 2023-11-20 LAB
BILIRUB UR QL STRIP: NORMAL
GLUCOSE UR-MCNC: NORMAL
HCG UR QL: 0.2 EU/DL
HGB UR QL STRIP.AUTO: NORMAL
KETONES UR-MCNC: NORMAL
LEUKOCYTE ESTERASE UR QL STRIP: NORMAL
NITRITE UR QL STRIP: NORMAL
PH UR STRIP: 6.5
PROT UR STRIP-MCNC: NORMAL
SP GR UR STRIP: 1.01

## 2023-11-27 ENCOUNTER — NON-APPOINTMENT (OUTPATIENT)
Age: 38
End: 2023-11-27

## 2023-11-27 ENCOUNTER — APPOINTMENT (OUTPATIENT)
Dept: OBGYN | Facility: CLINIC | Age: 38
End: 2023-11-27
Payer: COMMERCIAL

## 2023-11-27 LAB
CHROMOSOME13 INTERPRETATION: NORMAL
CHROMOSOME13 TEST RESULT: NORMAL
CHROMOSOME18 INTERPRETATION: NORMAL
CHROMOSOME18 TEST RESULT: NORMAL
CHROMOSOME21 INTERPRETATION: NORMAL
CHROMOSOME21 TEST RESULT: NORMAL
FETAL FRACTION: NORMAL
MICRODELETIONS INTERPRETATION: NORMAL
MICRODELETIONS RESULT: NORMAL
PERFORMANCE AND LIMITATIONS: NORMAL
SEX CHROMOSOME INTERPRETATION: NORMAL
SEX CHROMOSOME TEST RESULT: NORMAL
VERIFI WITH MICRODELETIONS: NOT DETECTED

## 2023-11-27 PROCEDURE — 90656 IIV3 VACC NO PRSV 0.5 ML IM: CPT

## 2023-11-27 PROCEDURE — 0502F SUBSEQUENT PRENATAL CARE: CPT

## 2023-11-27 PROCEDURE — 90471 IMMUNIZATION ADMIN: CPT

## 2023-11-30 ENCOUNTER — NON-APPOINTMENT (OUTPATIENT)
Age: 38
End: 2023-11-30

## 2023-11-30 ENCOUNTER — APPOINTMENT (OUTPATIENT)
Dept: OBGYN | Facility: CLINIC | Age: 38
End: 2023-11-30
Payer: COMMERCIAL

## 2023-11-30 VITALS
BODY MASS INDEX: 23.22 KG/M2 | SYSTOLIC BLOOD PRESSURE: 114 MMHG | HEIGHT: 64 IN | WEIGHT: 136 LBS | DIASTOLIC BLOOD PRESSURE: 73 MMHG

## 2023-11-30 DIAGNOSIS — Z34.91 ENCOUNTER FOR SUPERVISION OF NORMAL PREGNANCY, UNSPECIFIED, FIRST TRIMESTER: ICD-10-CM

## 2023-11-30 LAB
BILIRUB UR QL STRIP: NORMAL
CLARITY UR: CLEAR
COLLECTION METHOD: NORMAL
GLUCOSE UR-MCNC: NORMAL
HCG UR QL: 0.2 EU/DL
HGB UR QL STRIP.AUTO: NORMAL
KETONES UR-MCNC: NORMAL
LEUKOCYTE ESTERASE UR QL STRIP: NORMAL
NITRITE UR QL STRIP: NORMAL
PH UR STRIP: 7
PROT UR STRIP-MCNC: NORMAL
SP GR UR STRIP: 1.01

## 2023-11-30 PROCEDURE — 0502F SUBSEQUENT PRENATAL CARE: CPT

## 2023-11-30 RX ORDER — KRILL/OM-3/DHA/EPA/PHOSPHO/AST 1000-230MG
81 CAPSULE ORAL
Qty: 90 | Refills: 3 | Status: ACTIVE | COMMUNITY
Start: 2023-11-30 | End: 1900-01-01

## 2023-12-04 ENCOUNTER — APPOINTMENT (OUTPATIENT)
Dept: ANTEPARTUM | Facility: CLINIC | Age: 38
End: 2023-12-04

## 2023-12-04 ENCOUNTER — ASOB RESULT (OUTPATIENT)
Age: 38
End: 2023-12-04

## 2023-12-04 ENCOUNTER — LABORATORY RESULT (OUTPATIENT)
Age: 38
End: 2023-12-04

## 2023-12-04 ENCOUNTER — APPOINTMENT (OUTPATIENT)
Dept: ANTEPARTUM | Facility: CLINIC | Age: 38
End: 2023-12-04
Payer: COMMERCIAL

## 2023-12-04 ENCOUNTER — APPOINTMENT (OUTPATIENT)
Dept: MATERNAL FETAL MEDICINE | Facility: CLINIC | Age: 38
End: 2023-12-04
Payer: COMMERCIAL

## 2023-12-04 VITALS
BODY MASS INDEX: 23.05 KG/M2 | DIASTOLIC BLOOD PRESSURE: 66 MMHG | WEIGHT: 135 LBS | HEART RATE: 84 BPM | SYSTOLIC BLOOD PRESSURE: 118 MMHG | OXYGEN SATURATION: 99 % | RESPIRATION RATE: 16 BRPM | HEIGHT: 64 IN

## 2023-12-04 DIAGNOSIS — Z3A.12 12 WEEKS GESTATION OF PREGNANCY: ICD-10-CM

## 2023-12-04 DIAGNOSIS — O36.4XX0 MATERNAL CARE FOR INTRAUTERINE DEATH, NOT APPLICABLE OR UNSPECIFIED: ICD-10-CM

## 2023-12-04 DIAGNOSIS — Z78.9 OTHER SPECIFIED HEALTH STATUS: ICD-10-CM

## 2023-12-04 DIAGNOSIS — J45.909 DISEASES OF THE RESPIRATORY SYSTEM COMPLICATING PREGNANCY, FIRST TRIMESTER: ICD-10-CM

## 2023-12-04 DIAGNOSIS — O99.511 DISEASES OF THE RESPIRATORY SYSTEM COMPLICATING PREGNANCY, FIRST TRIMESTER: ICD-10-CM

## 2023-12-04 PROCEDURE — 36415 COLL VENOUS BLD VENIPUNCTURE: CPT

## 2023-12-04 PROCEDURE — 76813 OB US NUCHAL MEAS 1 GEST: CPT

## 2023-12-04 PROCEDURE — 99205 OFFICE O/P NEW HI 60 MIN: CPT

## 2023-12-05 PROBLEM — O36.4XX0 FETAL DEMISE AFFECTING DELIVERY: Status: ACTIVE | Noted: 2023-05-18

## 2023-12-05 PROBLEM — Z3A.12 12 WEEKS GESTATION OF PREGNANCY: Status: ACTIVE | Noted: 2023-12-04

## 2023-12-05 PROBLEM — O99.511 ASTHMA COMPLICATING PREGNANCY IN FIRST TRIMESTER: Status: ACTIVE | Noted: 2023-12-04

## 2023-12-18 ENCOUNTER — NON-APPOINTMENT (OUTPATIENT)
Age: 38
End: 2023-12-18

## 2023-12-18 ENCOUNTER — APPOINTMENT (OUTPATIENT)
Dept: OBGYN | Facility: CLINIC | Age: 38
End: 2023-12-18
Payer: COMMERCIAL

## 2023-12-18 VITALS
DIASTOLIC BLOOD PRESSURE: 70 MMHG | BODY MASS INDEX: 23.56 KG/M2 | SYSTOLIC BLOOD PRESSURE: 116 MMHG | WEIGHT: 138 LBS | HEIGHT: 64 IN

## 2023-12-18 LAB
BILIRUB UR QL STRIP: NORMAL
CLARITY UR: CLEAR
COLLECTION METHOD: NORMAL
GLUCOSE UR-MCNC: NORMAL
HCG UR QL: 0.2 EU/DL
HGB UR QL STRIP.AUTO: NORMAL
KETONES UR-MCNC: NORMAL
LEUKOCYTE ESTERASE UR QL STRIP: NORMAL
NITRITE UR QL STRIP: NORMAL
PH UR STRIP: 7.5
PROT UR STRIP-MCNC: NORMAL
SP GR UR STRIP: 1.01

## 2023-12-18 PROCEDURE — 0502F SUBSEQUENT PRENATAL CARE: CPT

## 2023-12-20 ENCOUNTER — NON-APPOINTMENT (OUTPATIENT)
Age: 38
End: 2023-12-20

## 2024-01-04 ENCOUNTER — APPOINTMENT (OUTPATIENT)
Dept: OBGYN | Facility: CLINIC | Age: 39
End: 2024-01-04
Payer: COMMERCIAL

## 2024-01-04 ENCOUNTER — NON-APPOINTMENT (OUTPATIENT)
Age: 39
End: 2024-01-04

## 2024-01-04 VITALS
BODY MASS INDEX: 24.24 KG/M2 | HEIGHT: 64 IN | DIASTOLIC BLOOD PRESSURE: 65 MMHG | SYSTOLIC BLOOD PRESSURE: 103 MMHG | WEIGHT: 142 LBS

## 2024-01-04 PROCEDURE — 0502F SUBSEQUENT PRENATAL CARE: CPT

## 2024-01-04 PROCEDURE — 36415 COLL VENOUS BLD VENIPUNCTURE: CPT

## 2024-01-11 ENCOUNTER — TRANSCRIPTION ENCOUNTER (OUTPATIENT)
Age: 39
End: 2024-01-11

## 2024-01-11 LAB
AFP MOM: 1.49
AFP VALUE: 60.3 NG/ML
ALPHA FETOPROTEIN SERUM COMMENT: NORMAL
ALPHA FETOPROTEIN SERUM INTERPRETATION: NORMAL
ALPHA FETOPROTEIN SERUM RESULTS: NORMAL
ALPHA FETOPROTEIN SERUM TEST RESULTS: NORMAL
GESTATIONAL AGE BASED ON: NORMAL
GESTATIONAL AGE ON COLLECTION DATE: 17.1 WEEKS
INSULIN DEP DIABETES: NO
MATERNAL AGE AT EDD AFP: 40.9 YR
MULTIPLE GESTATION: NO
OSBR RISK 1 IN: 2809
RACE: NORMAL
WEIGHT AFP: 142 LBS

## 2024-01-17 ENCOUNTER — NON-APPOINTMENT (OUTPATIENT)
Age: 39
End: 2024-01-17

## 2024-01-23 ENCOUNTER — ASOB RESULT (OUTPATIENT)
Age: 39
End: 2024-01-23

## 2024-01-23 ENCOUNTER — APPOINTMENT (OUTPATIENT)
Dept: OBGYN | Facility: CLINIC | Age: 39
End: 2024-01-23
Payer: COMMERCIAL

## 2024-01-23 ENCOUNTER — APPOINTMENT (OUTPATIENT)
Dept: ANTEPARTUM | Facility: CLINIC | Age: 39
End: 2024-01-23
Payer: COMMERCIAL

## 2024-01-23 VITALS
DIASTOLIC BLOOD PRESSURE: 73 MMHG | SYSTOLIC BLOOD PRESSURE: 108 MMHG | WEIGHT: 147 LBS | BODY MASS INDEX: 25.1 KG/M2 | HEIGHT: 64 IN

## 2024-01-23 PROCEDURE — 76817 TRANSVAGINAL US OBSTETRIC: CPT

## 2024-01-23 PROCEDURE — 0502F SUBSEQUENT PRENATAL CARE: CPT

## 2024-01-23 PROCEDURE — 76811 OB US DETAILED SNGL FETUS: CPT

## 2024-01-29 ENCOUNTER — APPOINTMENT (OUTPATIENT)
Dept: ANTEPARTUM | Facility: CLINIC | Age: 39
End: 2024-01-29

## 2024-01-30 ENCOUNTER — APPOINTMENT (OUTPATIENT)
Dept: ANTEPARTUM | Facility: CLINIC | Age: 39
End: 2024-01-30
Payer: COMMERCIAL

## 2024-01-30 ENCOUNTER — ASOB RESULT (OUTPATIENT)
Age: 39
End: 2024-01-30

## 2024-01-30 PROCEDURE — 76816 OB US FOLLOW-UP PER FETUS: CPT

## 2024-01-31 ENCOUNTER — NON-APPOINTMENT (OUTPATIENT)
Age: 39
End: 2024-01-31

## 2024-02-06 ENCOUNTER — APPOINTMENT (OUTPATIENT)
Dept: PEDIATRIC CARDIOLOGY | Facility: CLINIC | Age: 39
End: 2024-02-06
Payer: COMMERCIAL

## 2024-02-06 DIAGNOSIS — O35.9XX0 MATERNAL CARE FOR (SUSPECTED) FETAL ABNORMALITY AND DAMAGE, UNSPECIFIED, NOT APPLICABLE OR UNSPECIFIED: ICD-10-CM

## 2024-02-06 DIAGNOSIS — Z3A.22 22 WEEKS GESTATION OF PREGNANCY: ICD-10-CM

## 2024-02-06 PROCEDURE — 99204 OFFICE O/P NEW MOD 45 MIN: CPT

## 2024-02-06 PROCEDURE — 76825 ECHO EXAM OF FETAL HEART: CPT

## 2024-02-06 PROCEDURE — 76827 ECHO EXAM OF FETAL HEART: CPT

## 2024-02-06 PROCEDURE — 76821 MIDDLE CEREBRAL ARTERY ECHO: CPT

## 2024-02-06 PROCEDURE — 93325 DOPPLER ECHO COLOR FLOW MAPG: CPT | Mod: 59

## 2024-02-06 PROCEDURE — 76820 UMBILICAL ARTERY ECHO: CPT

## 2024-02-16 ENCOUNTER — NON-APPOINTMENT (OUTPATIENT)
Age: 39
End: 2024-02-16

## 2024-02-16 ENCOUNTER — APPOINTMENT (OUTPATIENT)
Dept: OBGYN | Facility: CLINIC | Age: 39
End: 2024-02-16
Payer: COMMERCIAL

## 2024-02-16 VITALS
BODY MASS INDEX: 26.63 KG/M2 | DIASTOLIC BLOOD PRESSURE: 64 MMHG | WEIGHT: 156 LBS | SYSTOLIC BLOOD PRESSURE: 105 MMHG | HEIGHT: 64 IN

## 2024-02-16 PROCEDURE — 0502F SUBSEQUENT PRENATAL CARE: CPT

## 2024-02-18 DIAGNOSIS — O26.892 OTHER SPECIFIED PREGNANCY RELATED CONDITIONS, SECOND TRIMESTER: ICD-10-CM

## 2024-02-18 DIAGNOSIS — R12 OTHER SPECIFIED PREGNANCY RELATED CONDITIONS, SECOND TRIMESTER: ICD-10-CM

## 2024-02-18 RX ORDER — FAMOTIDINE 40 MG/1
40 TABLET, FILM COATED ORAL DAILY
Qty: 60 | Refills: 2 | Status: ACTIVE | COMMUNITY
Start: 2024-02-18 | End: 1900-01-01

## 2024-02-21 ENCOUNTER — NON-APPOINTMENT (OUTPATIENT)
Age: 39
End: 2024-02-21

## 2024-02-21 ENCOUNTER — TRANSCRIPTION ENCOUNTER (OUTPATIENT)
Age: 39
End: 2024-02-21

## 2024-02-28 ENCOUNTER — NON-APPOINTMENT (OUTPATIENT)
Age: 39
End: 2024-02-28

## 2024-03-14 ENCOUNTER — NON-APPOINTMENT (OUTPATIENT)
Age: 39
End: 2024-03-14

## 2024-03-15 ENCOUNTER — APPOINTMENT (OUTPATIENT)
Dept: OBGYN | Facility: CLINIC | Age: 39
End: 2024-03-15
Payer: COMMERCIAL

## 2024-03-15 ENCOUNTER — ASOB RESULT (OUTPATIENT)
Age: 39
End: 2024-03-15

## 2024-03-15 ENCOUNTER — APPOINTMENT (OUTPATIENT)
Dept: ANTEPARTUM | Facility: CLINIC | Age: 39
End: 2024-03-15
Payer: COMMERCIAL

## 2024-03-15 VITALS
DIASTOLIC BLOOD PRESSURE: 76 MMHG | BODY MASS INDEX: 28 KG/M2 | SYSTOLIC BLOOD PRESSURE: 119 MMHG | WEIGHT: 164 LBS | HEIGHT: 64 IN

## 2024-03-15 DIAGNOSIS — O99.512 DISEASES OF THE RESPIRATORY SYSTEM COMPLICATING PREGNANCY, SECOND TRIMESTER: ICD-10-CM

## 2024-03-15 DIAGNOSIS — J45.909 DISEASES OF THE RESPIRATORY SYSTEM COMPLICATING PREGNANCY, SECOND TRIMESTER: ICD-10-CM

## 2024-03-15 DIAGNOSIS — Z34.92 ENCOUNTER FOR SUPERVISION OF NORMAL PREGNANCY, UNSPECIFIED, SECOND TRIMESTER: ICD-10-CM

## 2024-03-15 DIAGNOSIS — O99.282 ENDOCRINE, NUTRITIONAL AND METABOLIC DISEASES COMPLICATING PREGNANCY, SECOND TRIMESTER: ICD-10-CM

## 2024-03-15 DIAGNOSIS — E03.9 ENDOCRINE, NUTRITIONAL AND METABOLIC DISEASES COMPLICATING PREGNANCY, SECOND TRIMESTER: ICD-10-CM

## 2024-03-15 LAB
BILIRUB UR QL STRIP: NORMAL
GLUCOSE UR-MCNC: NORMAL
HCG UR QL: 0.2 EU/DL
HGB UR QL STRIP.AUTO: NORMAL
KETONES UR-MCNC: NORMAL
LEUKOCYTE ESTERASE UR QL STRIP: NORMAL
NITRITE UR QL STRIP: NORMAL
PH UR STRIP: 7
PROT UR STRIP-MCNC: NORMAL
SP GR UR STRIP: 1.01

## 2024-03-15 PROCEDURE — 0502F SUBSEQUENT PRENATAL CARE: CPT

## 2024-03-15 PROCEDURE — 76816 OB US FOLLOW-UP PER FETUS: CPT

## 2024-03-18 ENCOUNTER — NON-APPOINTMENT (OUTPATIENT)
Age: 39
End: 2024-03-18

## 2024-03-18 ENCOUNTER — APPOINTMENT (OUTPATIENT)
Dept: OBGYN | Facility: CLINIC | Age: 39
End: 2024-03-18
Payer: COMMERCIAL

## 2024-03-18 PROCEDURE — 36415 COLL VENOUS BLD VENIPUNCTURE: CPT

## 2024-03-19 ENCOUNTER — NON-APPOINTMENT (OUTPATIENT)
Age: 39
End: 2024-03-19

## 2024-03-19 LAB
ABO + RH PNL BLD: NORMAL
BASOPHILS # BLD AUTO: 0.06 K/UL
BASOPHILS NFR BLD AUTO: 0.6 %
BLD GP AB SCN SERPL QL: NORMAL
EOSINOPHIL # BLD AUTO: 0.33 K/UL
EOSINOPHIL NFR BLD AUTO: 3 %
GLUCOSE 1H P 50 G GLC PO SERPL-MCNC: 160 MG/DL
HCT VFR BLD CALC: 37.2 %
HCV RNA SERPL NAA+PROBE-LOG IU: NOT DETECTED LOGIU/ML
HEPC RNA INTERP: NOT DETECTED
HGB BLD-MCNC: 11.8 G/DL
HIV1+2 AB SPEC QL IA.RAPID: NONREACTIVE
IMM GRANULOCYTES NFR BLD AUTO: 0.5 %
LYMPHOCYTES # BLD AUTO: 2.91 K/UL
LYMPHOCYTES NFR BLD AUTO: 26.9 %
MAN DIFF?: NORMAL
MCHC RBC-ENTMCNC: 28.3 PG
MCHC RBC-ENTMCNC: 31.7 GM/DL
MCV RBC AUTO: 89.2 FL
MONOCYTES # BLD AUTO: 0.69 K/UL
MONOCYTES NFR BLD AUTO: 6.4 %
NEUTROPHILS # BLD AUTO: 6.78 K/UL
NEUTROPHILS NFR BLD AUTO: 62.6 %
PLATELET # BLD AUTO: 321 K/UL
RBC # BLD: 4.17 M/UL
RBC # FLD: 13.7 %
T PALLIDUM AB SER QL IA: NEGATIVE
WBC # FLD AUTO: 10.82 K/UL

## 2024-03-22 ENCOUNTER — APPOINTMENT (OUTPATIENT)
Dept: OBGYN | Facility: CLINIC | Age: 39
End: 2024-03-22

## 2024-03-22 ENCOUNTER — APPOINTMENT (OUTPATIENT)
Dept: ANTEPARTUM | Facility: CLINIC | Age: 39
End: 2024-03-22

## 2024-03-25 ENCOUNTER — NON-APPOINTMENT (OUTPATIENT)
Age: 39
End: 2024-03-25

## 2024-04-11 ENCOUNTER — APPOINTMENT (OUTPATIENT)
Dept: ANTEPARTUM | Facility: CLINIC | Age: 39
End: 2024-04-11
Payer: COMMERCIAL

## 2024-04-11 ENCOUNTER — APPOINTMENT (OUTPATIENT)
Dept: OBGYN | Facility: CLINIC | Age: 39
End: 2024-04-11
Payer: COMMERCIAL

## 2024-04-11 ENCOUNTER — ASOB RESULT (OUTPATIENT)
Age: 39
End: 2024-04-11

## 2024-04-11 VITALS
WEIGHT: 173 LBS | HEIGHT: 64 IN | DIASTOLIC BLOOD PRESSURE: 60 MMHG | BODY MASS INDEX: 29.53 KG/M2 | SYSTOLIC BLOOD PRESSURE: 105 MMHG

## 2024-04-11 DIAGNOSIS — Z3A.31 31 WEEKS GESTATION OF PREGNANCY: ICD-10-CM

## 2024-04-11 DIAGNOSIS — Z23 ENCOUNTER FOR IMMUNIZATION: ICD-10-CM

## 2024-04-11 PROCEDURE — 76816 OB US FOLLOW-UP PER FETUS: CPT

## 2024-04-11 PROCEDURE — 0502F SUBSEQUENT PRENATAL CARE: CPT

## 2024-04-25 ENCOUNTER — NON-APPOINTMENT (OUTPATIENT)
Age: 39
End: 2024-04-25

## 2024-04-25 ENCOUNTER — APPOINTMENT (OUTPATIENT)
Dept: OBGYN | Facility: CLINIC | Age: 39
End: 2024-04-25
Payer: COMMERCIAL

## 2024-04-25 DIAGNOSIS — Z34.93 ENCOUNTER FOR SUPERVISION OF NORMAL PREGNANCY, UNSPECIFIED, THIRD TRIMESTER: ICD-10-CM

## 2024-04-25 DIAGNOSIS — Z3A.33 33 WEEKS GESTATION OF PREGNANCY: ICD-10-CM

## 2024-04-25 PROCEDURE — 0502F SUBSEQUENT PRENATAL CARE: CPT

## 2024-04-28 LAB
BILIRUB UR QL STRIP: NORMAL
GLUCOSE UR-MCNC: NORMAL
HCG UR QL: 0.2 EU/DL
HGB UR QL STRIP.AUTO: NORMAL
KETONES UR-MCNC: NORMAL
LEUKOCYTE ESTERASE UR QL STRIP: NORMAL
NITRITE UR QL STRIP: NORMAL
PH UR STRIP: 6
PROT UR STRIP-MCNC: NORMAL
SP GR UR STRIP: 1.02

## 2024-04-30 ENCOUNTER — NON-APPOINTMENT (OUTPATIENT)
Age: 39
End: 2024-04-30

## 2024-05-02 ENCOUNTER — NON-APPOINTMENT (OUTPATIENT)
Age: 39
End: 2024-05-02

## 2024-05-09 ENCOUNTER — APPOINTMENT (OUTPATIENT)
Dept: ANTEPARTUM | Facility: CLINIC | Age: 39
End: 2024-05-09

## 2024-05-09 ENCOUNTER — NON-APPOINTMENT (OUTPATIENT)
Age: 39
End: 2024-05-09

## 2024-05-09 ENCOUNTER — ASOB RESULT (OUTPATIENT)
Age: 39
End: 2024-05-09

## 2024-05-09 ENCOUNTER — APPOINTMENT (OUTPATIENT)
Dept: OBGYN | Facility: CLINIC | Age: 39
End: 2024-05-09
Payer: COMMERCIAL

## 2024-05-09 ENCOUNTER — APPOINTMENT (OUTPATIENT)
Dept: ANTEPARTUM | Facility: CLINIC | Age: 39
End: 2024-05-09
Payer: COMMERCIAL

## 2024-05-09 VITALS
DIASTOLIC BLOOD PRESSURE: 74 MMHG | WEIGHT: 181 LBS | SYSTOLIC BLOOD PRESSURE: 118 MMHG | BODY MASS INDEX: 30.9 KG/M2 | HEIGHT: 64 IN

## 2024-05-09 DIAGNOSIS — Z3A.35 35 WEEKS GESTATION OF PREGNANCY: ICD-10-CM

## 2024-05-09 PROCEDURE — 76816 OB US FOLLOW-UP PER FETUS: CPT

## 2024-05-09 PROCEDURE — 76819 FETAL BIOPHYS PROFIL W/O NST: CPT

## 2024-05-09 PROCEDURE — 0502F SUBSEQUENT PRENATAL CARE: CPT

## 2024-05-10 LAB
BILIRUB UR QL STRIP: NORMAL
C TRACH RRNA SPEC QL NAA+PROBE: NOT DETECTED
GLUCOSE UR-MCNC: ABNORMAL
HCG UR QL: 0.2 EU/DL
HGB UR QL STRIP.AUTO: NORMAL
KETONES UR-MCNC: NORMAL
LEUKOCYTE ESTERASE UR QL STRIP: NORMAL
N GONORRHOEA RRNA SPEC QL NAA+PROBE: NOT DETECTED
NITRITE UR QL STRIP: NORMAL
PH UR STRIP: 5.5
PROT UR STRIP-MCNC: NORMAL
SOURCE AMPLIFICATION: NORMAL
SP GR UR STRIP: 1.01

## 2024-05-13 LAB — B-HEM STREP SPEC QL CULT: NORMAL

## 2024-05-16 ENCOUNTER — APPOINTMENT (OUTPATIENT)
Dept: ANTEPARTUM | Facility: CLINIC | Age: 39
End: 2024-05-16

## 2024-05-16 ENCOUNTER — ASOB RESULT (OUTPATIENT)
Age: 39
End: 2024-05-16

## 2024-05-16 ENCOUNTER — APPOINTMENT (OUTPATIENT)
Dept: ANTEPARTUM | Facility: CLINIC | Age: 39
End: 2024-05-16
Payer: COMMERCIAL

## 2024-05-16 ENCOUNTER — APPOINTMENT (OUTPATIENT)
Dept: OBGYN | Facility: CLINIC | Age: 39
End: 2024-05-16
Payer: COMMERCIAL

## 2024-05-16 VITALS
SYSTOLIC BLOOD PRESSURE: 107 MMHG | OXYGEN SATURATION: 97 % | HEIGHT: 60 IN | DIASTOLIC BLOOD PRESSURE: 76 MMHG | BODY MASS INDEX: 35.34 KG/M2 | WEIGHT: 180 LBS | TEMPERATURE: 98 F | HEART RATE: 114 BPM

## 2024-05-16 DIAGNOSIS — Z3A.36 36 WEEKS GESTATION OF PREGNANCY: ICD-10-CM

## 2024-05-16 DIAGNOSIS — O09.511 SUPERVISION OF ELDERLY PRIMIGRAVIDA, FIRST TRIMESTER: ICD-10-CM

## 2024-05-16 LAB
BILIRUB UR QL STRIP: NORMAL
GLUCOSE UR-MCNC: ABNORMAL
HCG UR QL: 0.2 EU/DL
HGB UR QL STRIP.AUTO: NORMAL
KETONES UR-MCNC: ABNORMAL
LEUKOCYTE ESTERASE UR QL STRIP: NORMAL
NITRITE UR QL STRIP: NORMAL
PH UR STRIP: 6
PROT UR STRIP-MCNC: ABNORMAL
SP GR UR STRIP: 1.02

## 2024-05-16 PROCEDURE — 76819 FETAL BIOPHYS PROFIL W/O NST: CPT

## 2024-05-16 PROCEDURE — 0502F SUBSEQUENT PRENATAL CARE: CPT

## 2024-05-16 PROCEDURE — 76816 OB US FOLLOW-UP PER FETUS: CPT

## 2024-05-22 ENCOUNTER — NON-APPOINTMENT (OUTPATIENT)
Age: 39
End: 2024-05-22

## 2024-05-23 ENCOUNTER — ASOB RESULT (OUTPATIENT)
Age: 39
End: 2024-05-23

## 2024-05-23 ENCOUNTER — APPOINTMENT (OUTPATIENT)
Dept: ANTEPARTUM | Facility: CLINIC | Age: 39
End: 2024-05-23
Payer: COMMERCIAL

## 2024-05-23 ENCOUNTER — APPOINTMENT (OUTPATIENT)
Dept: OBGYN | Facility: CLINIC | Age: 39
End: 2024-05-23
Payer: COMMERCIAL

## 2024-05-23 DIAGNOSIS — Z78.9 OTHER SPECIFIED HEALTH STATUS: ICD-10-CM

## 2024-05-23 DIAGNOSIS — Z3A.37 37 WEEKS GESTATION OF PREGNANCY: ICD-10-CM

## 2024-05-23 DIAGNOSIS — O09.519 SUPERVISION OF ELDERLY PRIMIGRAVIDA, UNSPECIFIED TRIMESTER: ICD-10-CM

## 2024-05-23 PROCEDURE — 76819 FETAL BIOPHYS PROFIL W/O NST: CPT

## 2024-05-23 PROCEDURE — 0502F SUBSEQUENT PRENATAL CARE: CPT

## 2024-05-24 ENCOUNTER — NON-APPOINTMENT (OUTPATIENT)
Age: 39
End: 2024-05-24

## 2024-05-30 ENCOUNTER — NON-APPOINTMENT (OUTPATIENT)
Age: 39
End: 2024-05-30

## 2024-05-30 ENCOUNTER — APPOINTMENT (OUTPATIENT)
Dept: OBGYN | Facility: CLINIC | Age: 39
End: 2024-05-30
Payer: COMMERCIAL

## 2024-05-30 ENCOUNTER — APPOINTMENT (OUTPATIENT)
Dept: ANTEPARTUM | Facility: CLINIC | Age: 39
End: 2024-05-30

## 2024-05-30 ENCOUNTER — APPOINTMENT (OUTPATIENT)
Dept: ANTEPARTUM | Facility: CLINIC | Age: 39
End: 2024-05-30
Payer: COMMERCIAL

## 2024-05-30 ENCOUNTER — ASOB RESULT (OUTPATIENT)
Age: 39
End: 2024-05-30

## 2024-05-30 VITALS
HEIGHT: 60 IN | BODY MASS INDEX: 36.4 KG/M2 | SYSTOLIC BLOOD PRESSURE: 110 MMHG | WEIGHT: 185.38 LBS | DIASTOLIC BLOOD PRESSURE: 74 MMHG

## 2024-05-30 DIAGNOSIS — O09.819 SUPERVISION OF PREGNANCY RESULTING FROM ASSISTED REPRODUCTIVE TECHNOLOGY, UNSPECIFIED TRIMESTER: ICD-10-CM

## 2024-05-30 DIAGNOSIS — Z3A.38 38 WEEKS GESTATION OF PREGNANCY: ICD-10-CM

## 2024-05-30 DIAGNOSIS — O09.523 SUPERVISION OF ELDERLY MULTIGRAVIDA, THIRD TRIMESTER: ICD-10-CM

## 2024-05-30 LAB
BILIRUB UR QL STRIP: NORMAL
GLUCOSE UR-MCNC: NORMAL
HCG UR QL: 0.2 EU/DL
HGB UR QL STRIP.AUTO: NORMAL
KETONES UR-MCNC: NORMAL
LEUKOCYTE ESTERASE UR QL STRIP: NORMAL
NITRITE UR QL STRIP: NORMAL
PH UR STRIP: 6
PROT UR STRIP-MCNC: NORMAL
SP GR UR STRIP: 1.01

## 2024-05-30 PROCEDURE — 0502F SUBSEQUENT PRENATAL CARE: CPT

## 2024-05-30 PROCEDURE — 76819 FETAL BIOPHYS PROFIL W/O NST: CPT

## 2024-06-07 ENCOUNTER — APPOINTMENT (OUTPATIENT)
Dept: OBGYN | Facility: HOSPITAL | Age: 39
End: 2024-06-07

## 2024-06-12 ENCOUNTER — NON-APPOINTMENT (OUTPATIENT)
Age: 39
End: 2024-06-12

## 2024-06-17 ENCOUNTER — APPOINTMENT (OUTPATIENT)
Dept: OBGYN | Facility: CLINIC | Age: 39
End: 2024-06-17
Payer: COMMERCIAL

## 2024-06-17 VITALS
WEIGHT: 168.31 LBS | BODY MASS INDEX: 33.04 KG/M2 | HEIGHT: 60 IN | DIASTOLIC BLOOD PRESSURE: 65 MMHG | SYSTOLIC BLOOD PRESSURE: 106 MMHG

## 2024-06-17 DIAGNOSIS — Z98.891 HISTORY OF UTERINE SCAR FROM PREVIOUS SURGERY: ICD-10-CM

## 2024-06-17 PROCEDURE — 0503F POSTPARTUM CARE VISIT: CPT

## 2024-06-17 NOTE — PHYSICAL EXAM
[Appropriately responsive] : appropriately responsive [Alert] : alert [No Acute Distress] : no acute distress [Regular Rate Rhythm] : regular rate rhythm [No Murmurs] : no murmurs [Clear to Auscultation B/L] : clear to auscultation bilaterally [Soft] : soft [Non-tender] : non-tender [Non-distended] : non-distended [No Mass] : no mass [Oriented x3] : oriented x3 [FreeTextEntry7] : incision closed and intact   no drainage or erythema

## 2024-06-17 NOTE — HISTORY OF PRESENT ILLNESS
[FreeTextEntry1] : s/p primary  delivery on 24 for breech presentation  patient denies fever/chills, nausea/emesis, dyspnea, or dizziness  Denies heavy vaginal bleeding or severe pelvic pain  Tolerating regular diet and reports normal bowel movements

## 2024-06-17 NOTE — PLAN
[FreeTextEntry1] : keep wound area clean and dry  no heavy lifting x 6 weeks  Er warnings given  follow up 4 weeks for bcm discussion

## 2024-07-15 ENCOUNTER — APPOINTMENT (OUTPATIENT)
Dept: OBGYN | Facility: CLINIC | Age: 39
End: 2024-07-15
Payer: COMMERCIAL

## 2024-07-15 ENCOUNTER — LABORATORY RESULT (OUTPATIENT)
Age: 39
End: 2024-07-15

## 2024-07-15 VITALS
DIASTOLIC BLOOD PRESSURE: 71 MMHG | SYSTOLIC BLOOD PRESSURE: 107 MMHG | BODY MASS INDEX: 32.98 KG/M2 | WEIGHT: 168 LBS | HEIGHT: 60 IN

## 2024-07-15 DIAGNOSIS — Z01.419 ENCOUNTER FOR GYNECOLOGICAL EXAMINATION (GENERAL) (ROUTINE) W/OUT ABNORMAL FINDINGS: ICD-10-CM

## 2024-07-15 DIAGNOSIS — Z30.011 ENCOUNTER FOR INITIAL PRESCRIPTION OF CONTRACEPTIVE PILLS: ICD-10-CM

## 2024-07-15 DIAGNOSIS — Z12.4 ENCOUNTER FOR SCREENING FOR MALIGNANT NEOPLASM OF CERVIX: ICD-10-CM

## 2024-07-15 PROCEDURE — 99459 PELVIC EXAMINATION: CPT

## 2024-07-15 PROCEDURE — 99396 PREV VISIT EST AGE 40-64: CPT

## 2024-07-15 RX ORDER — NORETHINDRONE ACETATE AND ETHINYL ESTRADIOL AND FERROUS FUMARATE 1.5-30(21)
1.5-3 KIT ORAL
Qty: 3 | Refills: 3 | Status: ACTIVE | COMMUNITY
Start: 2024-07-15 | End: 1900-01-01

## 2024-08-09 ENCOUNTER — APPOINTMENT (OUTPATIENT)
Dept: HUMAN REPRODUCTION | Facility: CLINIC | Age: 39
End: 2024-08-09

## 2024-10-23 ENCOUNTER — NON-APPOINTMENT (OUTPATIENT)
Age: 39
End: 2024-10-23

## 2025-03-06 ENCOUNTER — APPOINTMENT (OUTPATIENT)
Dept: ULTRASOUND IMAGING | Facility: CLINIC | Age: 40
End: 2025-03-06

## 2025-03-06 PROCEDURE — 76536 US EXAM OF HEAD AND NECK: CPT

## 2025-03-12 ENCOUNTER — APPOINTMENT (OUTPATIENT)
Dept: MAMMOGRAPHY | Facility: CLINIC | Age: 40
End: 2025-03-12
Payer: COMMERCIAL

## 2025-03-12 PROCEDURE — 77067 SCR MAMMO BI INCL CAD: CPT

## 2025-03-12 PROCEDURE — 77063 BREAST TOMOSYNTHESIS BI: CPT

## 2025-07-17 ENCOUNTER — APPOINTMENT (OUTPATIENT)
Dept: OBGYN | Facility: CLINIC | Age: 40
End: 2025-07-17

## (undated) DEVICE — VENODYNE/SCD SLEEVE CALF MEDIUM

## (undated) DEVICE — TUBING ASPIRATION HANDLE

## (undated) DEVICE — SUCTION YANKAUER TAPERED BULBOUS NO VENT

## (undated) DEVICE — TUBING MEDI-VAC W MAXIGRIP CONNECTORS 1/4"X6'

## (undated) DEVICE — DRAPE LIGHT HANDLE COVER (GREEN)

## (undated) DEVICE — PACK LITHOTOMY

## (undated) DEVICE — GLV 8.5 PROTEXIS (WHITE)

## (undated) DEVICE — LAP PAD W RING 18 X 18"

## (undated) DEVICE — PREP DYNA-HEX CHG 4% 4OZ BOTTLE (BACTOSHIELD)

## (undated) DEVICE — PREP TRAY DRY SKIN PREP SCRUB

## (undated) DEVICE — NDL COUNTER FOAM AND MAGNET 40-70

## (undated) DEVICE — WARMING BLANKET UPPER ADULT

## (undated) DEVICE — GLV 7 PROTEXIS (WHITE)

## (undated) DEVICE — VACUUM CURETTE MEDGYN CURVED 13MM